# Patient Record
Sex: FEMALE | Race: WHITE | Employment: OTHER | URBAN - METROPOLITAN AREA
[De-identification: names, ages, dates, MRNs, and addresses within clinical notes are randomized per-mention and may not be internally consistent; named-entity substitution may affect disease eponyms.]

---

## 2017-02-02 ENCOUNTER — OFFICE VISIT (OUTPATIENT)
Dept: ENDOCRINOLOGY | Age: 52
End: 2017-02-02

## 2017-02-02 VITALS
HEART RATE: 70 BPM | SYSTOLIC BLOOD PRESSURE: 110 MMHG | RESPIRATION RATE: 18 BRPM | BODY MASS INDEX: 26.61 KG/M2 | TEMPERATURE: 96.3 F | WEIGHT: 155.9 LBS | DIASTOLIC BLOOD PRESSURE: 55 MMHG | HEIGHT: 64 IN

## 2017-02-02 DIAGNOSIS — E04.9 NODULAR GOITER: ICD-10-CM

## 2017-02-02 DIAGNOSIS — K31.84 GASTROPARESIS DUE TO DM (HCC): ICD-10-CM

## 2017-02-02 DIAGNOSIS — Z79.4 TYPE 2 DIABETES MELLITUS WITH HYPERGLYCEMIA, WITH LONG-TERM CURRENT USE OF INSULIN (HCC): Primary | ICD-10-CM

## 2017-02-02 DIAGNOSIS — I10 ESSENTIAL HYPERTENSION WITH GOAL BLOOD PRESSURE LESS THAN 140/90: ICD-10-CM

## 2017-02-02 DIAGNOSIS — E11.43 GASTROPARESIS DUE TO DM (HCC): ICD-10-CM

## 2017-02-02 DIAGNOSIS — D35.02 ADRENAL ADENOMA, LEFT: ICD-10-CM

## 2017-02-02 DIAGNOSIS — E11.65 TYPE 2 DIABETES MELLITUS WITH HYPERGLYCEMIA, WITH LONG-TERM CURRENT USE OF INSULIN (HCC): Primary | ICD-10-CM

## 2017-02-02 DIAGNOSIS — E53.8 VITAMIN B12 DEFICIENCY: ICD-10-CM

## 2017-02-02 LAB
GLUCOSE POC: 281 MG/DL
HBA1C MFR BLD HPLC: 8.8 %

## 2017-02-02 RX ORDER — INSULIN ASPART 100 [IU]/ML
INJECTION, SOLUTION INTRAVENOUS; SUBCUTANEOUS
Qty: 30 ML | Refills: 5 | Status: SHIPPED | OUTPATIENT
Start: 2017-02-02 | End: 2021-05-18 | Stop reason: SDUPTHER

## 2017-02-02 RX ORDER — CYANOCOBALAMIN 1000 UG/ML
1000 INJECTION, SOLUTION INTRAMUSCULAR; SUBCUTANEOUS
Qty: 1 VIAL | Refills: 5 | Status: SHIPPED | OUTPATIENT
Start: 2017-02-02

## 2017-02-02 RX ORDER — INSULIN GLARGINE 100 [IU]/ML
INJECTION, SOLUTION SUBCUTANEOUS
Qty: 15 ML | Refills: 5 | Status: SHIPPED | OUTPATIENT
Start: 2017-02-02 | End: 2017-12-18 | Stop reason: SDUPTHER

## 2017-02-02 NOTE — PROGRESS NOTES
Sherly Conteh is a 45 y.o. female here for   Chief Complaint   Patient presents with    Diabetes     f/u    Thyroid Problem     f/u    Adrenal Problem     f/u       Functional glucose monitor and record keeping system? - yes  Eye exam within last year? - yes April 2016  Foot exam within last year? - yes, 10/2016    Lab Results   Component Value Date/Time    Hemoglobin A1c (POC) 9.8 11/02/2016 04:50 PM    Hemoglobin A1c, External 10.3 08/12/2016       Wt Readings from Last 3 Encounters:   11/02/16 149 lb (67.6 kg)   06/21/16 151 lb 11.2 oz (68.8 kg)     Temp Readings from Last 3 Encounters:   11/02/16 96.8 °F (36 °C) (Oral)   06/21/16 98.3 °F (36.8 °C) (Oral)     BP Readings from Last 3 Encounters:   11/02/16 157/74   06/21/16 99/54     Pulse Readings from Last 3 Encounters:   11/02/16 69   06/21/16 75

## 2017-02-02 NOTE — MR AVS SNAPSHOT
Visit Information Date & Time Provider Department Dept. Phone Encounter #  
 2/2/2017  9:30 AM Nikole Mcneal MD [facility] [phone] [id number] Follow-up Instructions Return in about 3 months (around 5/2/2017). Upcoming Health Maintenance Date Due  
 FOOT EXAM Q1 8/14/1974 MICROALBUMIN Q1 8/14/1974 EYE EXAM RETINAL OR DILATED Q1 8/14/1974 Pneumococcal 19-64 Medium Risk (1 of 1 - PPSV23) 8/14/1983 DTaP/Tdap/Td series (1 - Tdap) 8/14/1985 PAP AKA CERVICAL CYTOLOGY 8/14/1985 BREAST CANCER SCRN MAMMOGRAM 8/14/2014 FOBT Q 1 YEAR AGE 50-75 8/14/2014 INFLUENZA AGE 9 TO ADULT 8/1/2016 HEMOGLOBIN A1C Q6M 5/2/2017 LIPID PANEL Q1 8/12/2017 Allergies as of 2/2/2017  Review Complete On: 2/2/2017 By: Nikole Mcneal MD  
  
 Severity Noted Reaction Type Reactions Codeine  06/21/2016    Other (comments) Welps Lyrica [Pregabalin]  06/21/2016    Palpitations Percocet [Oxycodone-acetaminophen]  06/21/2016    Other (comments) Welps Sulfur  06/21/2016    Contact Dermatitis Current Immunizations  Never Reviewed No immunizations on file. Not reviewed this visit You Were Diagnosed With   
  
 Codes Comments Type 2 diabetes mellitus with hyperglycemia, with long-term current use of insulin (HCC)    -  Primary ICD-10-CM: E11.65, Z79.4 ICD-9-CM: 250.00, 790.29, V58.67 Adrenal adenoma, left     ICD-10-CM: D35.02 
ICD-9-CM: 227.0 Nodular goiter     ICD-10-CM: E04.9 ICD-9-CM: 241.9 Essential hypertension with goal blood pressure less than 140/90     ICD-10-CM: I10 
ICD-9-CM: 401.9 Vitals BP Pulse Temp Resp Height(growth percentile) Weight(growth percentile) 110/55 (BP 1 Location: Right arm, BP Patient Position: Sitting) 70 96.3 °F (35.7 °C) (Oral) 18 5' 4\" (1.626 m) 155 lb 14.4 oz (70.7 kg) BMI OB Status Smoking Status 26.76 kg/m2 Hysterectomy Never Smoker BMI and BSA Data Body Mass Index Body Surface Area  
 26.76 kg/m 2 1.79 m 2 Preferred Pharmacy Pharmacy Name Phone [facility] [address], [address] [facility] Octavia Burrell [phone] Your Updated Medication List  
  
   
This list is accurate as of: 2/2/17  9:50 AM.  Always use your most recent med list. amLODIPine 10 mg tablet Commonly known as:  Sharath [place] Take 1 Tab by mouth every morning. aspirin delayed-release 81 mg tablet Take  by mouth daily. aspirin-dipyridamole  mg per SR capsule Commonly known as:  AGGRENOX Take 1 Cap by mouth two (2) times a day. atorvastatin 80 mg tablet Commonly known as:  LIPITOR Take 80 mg by mouth nightly. cyanocobalamin 1,000 mcg/mL injection Commonly known as:  VITAMIN B12  
1,000 mcg by IntraMUSCular route every thirty (30) days. DULoxetine 60 mg capsule Commonly known as:  CYMBALTA Take 60 mg by mouth daily. ergocalciferol 50,000 unit capsule Commonly known as:  ERGOCALCIFEROL Take 2,000 Units by mouth daily. folic acid 1 mg tablet Commonly known as:  FOLVITE  
take 1 tablet by mouth once daily  
  
 insulin aspart 100 unit/mL Inpn Commonly known as:  NOVOLOG  
14 Units by SubCUTAneous route Before breakfast, lunch, and dinner. W/ SSI  
  
 meloxicam 7.5 mg tablet Commonly known as:  MOBIC  
take 1 tablet by mouth once daily  
  
 metoprolol succinate 100 mg tablet Commonly known as:  TOPROL-XL Take 100 mg by mouth daily. ondansetron hcl 4 mg tablet Commonly known as:  Kiran Symone Take 4 mg by mouth every eight (8) hours as needed for Nausea. pantoprazole 20 mg tablet Commonly known as:  PROTONIX Take 20 mg by mouth daily. SITagliptin 100 mg tablet Commonly known as:  [place] Take 100 mg by mouth every morning. valsartan 160 mg tablet Commonly known as:  DIOVAN Take 160 mg by mouth daily. zolpidem 5 mg tablet Commonly known as:  AMBIEN Take  by mouth nightly as needed for Sleep. We Performed the Following AMB POC GLUCOSE, QUANTITATIVE, BLOOD [64630 CPT(R)] AMB POC HEMOGLOBIN A1C [34826 CPT(R)] Follow-up Instructions Return in about 3 months (around 5/2/2017). Patient Instructions Check blood sugars before meals/breakfast and at bedtime. Low blood glucose is less than 70 Maintain the log and bring it all your appointments If the bedtime sugars are less than 100 ,eat a 15 gm snack. Lantus 45 units in AM  
 
 
Novolog or Humalog or Apidra insulin 14 units after breakfast, 14 units after lunch and 18 units after dinner. If sugars before meals are less than 70 then take half the scheduled dose instead of the full dose Additional Novolog or Humalog or Apidra  for high blood sugars 150-200 mg   2 units 201-250 mg   4 units 251-300 mg   6 units 301-350 mg   8 units 351-400 mg   10 units Introducing [facility] SERVICES! Dear Kitty Correa: Thank you for requesting a [employer] account. Our records indicate that you already have an active [employer] account. You can access your account anytime at [email or web address] Did you know that you can access your hospital and ER discharge instructions at any time in [employer]? You can also review all of your test results from your hospital stay or ER visit. Additional Information If you have questions, please visit the Frequently Asked Questions section of the [employer] website at [email or web address] Remember, [employer] is NOT to be used for urgent needs. For medical emergencies, dial 911. Now available from your iPhone and Android! Please provide this summary of care documentation to your next provider. Your primary care clinician is listed as Nahomy Antonio. If you have any questions after today's visit, please call [phone].

## 2017-02-02 NOTE — PATIENT INSTRUCTIONS
Check blood sugars before meals/breakfast and at bedtime. Low blood glucose is less than 70     Maintain the log and bring it all your appointments    If the bedtime sugars are less than 100 ,eat a 15 gm snack. Lantus 45 units in AM       Novolog or Humalog or Apidra insulin 14 units after breakfast, 14 units after lunch and 18 units after dinner.    If sugars before meals are less than 70 then take half the scheduled dose instead of the full dose    Additional Novolog or Humalog or Apidra  for high blood sugars     150-200 mg   2 units   201-250 mg   4 units   251-300 mg   6 units   301-350 mg   8 units   351-400 mg   10 units

## 2017-02-02 NOTE — PROGRESS NOTES
[facility]               Barb Smith MD        [address] 78 [phone] Fax [phone]               Patient Information  Date:2/2/2017  Name : Nunu Craig 45 y.o.     YOB: 1965         Referred by: MIKI Murray         Chief Complaint   Patient presents with    Diabetes     f/u    Thyroid Problem     f/u    Adrenal Problem     f/u       History of Present Illness: Nunu Craig is a 45 y.o. female here for  Fu of  Type 2 Diabetes Mellitus. Type 2 Diabetes was diagnosed in 2006 . End organ effects of diabetes: peripheral neuropathy, CVA     Cardiovascular risk factors: smoking/ tobacco exposure, family history, dyslipidemia, diabetes mellitus   Monitoring frequency:3 /day and readings run 130 - 180     She was found to have left 1 cm adrenal nodule on CT scan - incidental finding   Work up was negative    No log as she forgot   fastings and bedtime BG are higher     In pain and being followed by spine clinic       Has gastroparesis   No relation to food        Wt Readings from Last 3 Encounters:   02/02/17 155 lb 14.4 oz (70.7 kg)   11/02/16 149 lb (67.6 kg)   06/21/16 151 lb 11.2 oz (68.8 kg)       BP Readings from Last 3 Encounters:   02/02/17 110/55   11/02/16 157/74   06/21/16 99/54           Past Medical History   Diagnosis Date    CAD (coronary artery disease)     [facility]     Fibromyalgia     Hyperlipidemia     Hypertension     Lupus [facility]     Neuropathy     Stroke ([facility] 12/2015     Current Outpatient Prescriptions   Medication Sig    meloxicam (MOBIC) 7.5 mg tablet take 1 tablet by mouth once daily     folic acid (FOLVITE) 1 mg tablet take 1 tablet by mouth once daily    amLODIPine (NORVASC) 10 mg tablet Take 1 Tab by mouth every morning.  sitaGLIPtin (JANUVIA) 100 mg tablet Take 100 mg by mouth every morning.  valsartan (DIOVAN) 160 mg tablet Take 160 mg by mouth daily.     metoprolol succinate (TOPROL-XL) 100 mg tablet Take 100 mg by mouth daily.  DULoxetine (CYMBALTA) 60 mg capsule Take 60 mg by mouth daily.  pantoprazole (PROTONIX) 20 mg tablet Take 20 mg by mouth daily.  aspirin delayed-release 81 mg tablet Take  by mouth daily.  ergocalciferol (ERGOCALCIFEROL) 50,000 unit capsule Take 2,000 Units by mouth daily.  atorvastatin (LIPITOR) 80 mg tablet Take 80 mg by mouth nightly.  aspirin-dipyridamole (AGGRENOX)  mg per SR capsule Take 1 Cap by mouth two (2) times a day.  zolpidem (AMBIEN) 5 mg tablet Take  by mouth nightly as needed for Sleep.  ondansetron hcl (ZOFRAN) 4 mg tablet Take 4 mg by mouth every eight (8) hours as needed for Nausea.  cyanocobalamin (VITAMIN B12) 1,000 mcg/mL injection 1 mL by IntraMUSCular route every thirty (30) days.  insulin aspart (NOVOLOG) 100 unit/mL inpn Inject 14 units after breakfast, 14 units after lunch and 18 units after dinner w/ SSI Max units daily: 76    insulin glargine (LANTUS SOLOSTAR) 100 unit/mL (3 mL) pen Inject 45 units in the AM     No current facility-administered medications for this visit. Allergies   Allergen Reactions    Codeine Other (comments)     Welps    Lyrica [Pregabalin] Palpitations    Percocet [Oxycodone-Acetaminophen] Other (comments)     Welps    Sulfur Contact Dermatitis         Review of Systems:  - Constitutional Symptoms: no fevers, no chills, no weight loss  - Eyes: no blurry vision no double vision  - Cardiovascular: no chest pain ,no palpitations  - Musculoskeletal: no joint pains + weakness  - Integumentary: no rashes  - Neurological: + numbness, tingling, no  headaches  - Psychiatric: no depression no  anxiety  - Endocrine: no heat or cold intolerance    Physical Examination:   Blood pressure 110/55, pulse 70, temperature 96.3 °F (35.7 °C), temperature source Oral, resp. rate 18, height 5' 4\" (1.626 m), weight 155 lb 14.4 oz (70.7 kg).  Estimated body mass index is 26.76 kg/(m^2) as calculated from the following:    Height as of this encounter: 5' 4\" (1.626 m). -   Weight as of this encounter: 155 lb 14.4 oz (70.7 kg). - General: pleasant, no distress, good eye contact  - HEENT: no pallor, no periorbital edema, EOMI  - Neck: supple,   - Cardiovascular: regular, normal rate, normal S1 and S2,   - Respiratory: clear to auscultation bilaterally  - Gastrointestinal: soft, nontender, nondistended,  BS +  - Musculoskeletal: no proximal muscle weakness in upper or lower extremities  - Integumentary:  callus   - Neurological: alert and oriented  - Psychiatric: normal mood and affect  - Skin: color, texture, turgor normal.       Data Reviewed:     [] Glucose records reviewed. [] See glucose records for details (to be scanned). [] A1C  [] Reviewed labs    CR nl     Assessment/Plan:     1. Type 2 diabetes mellitus with hyperglycemia, with long-term current use of insulin ([address]    2. Adrenal adenoma, left    3. Nodular goiter    4. Essential hypertension with goal blood pressure less than 140/90    5. Gastroparesis due to DM ([address]        1. Type 2 Diabetes Mellitus with neuropathy ,CVD ,uncontrolled    Lab Results   Component Value Date/Time    Hemoglobin A1c (POC) 8.8 02/02/2017 09:31 AM    Hemoglobin A1c, External 10.3 08/12/2016       Lantus 45 units   Humalog 14/14/18  units after  meals   Mismatch of insulin and food absorption due to gastroparesis , small meals     Advised to check glucose 3- 4 times daily        2. HTN : Continue current therapy     3. Hyperlipidemia : Continue statin. 4.Gatroparesis  Gastroparesis -Dietary modifications discussed - small frequent meals , on narcotics which is worsening     5 Hypoglycemia - IPRO not done      6 Nodular goiter - euthyroid   US 1 cm nodule  Discussed natural course of thyroid nodules     7.  CVA -   > 50 % of the time spent face to time     8 Left adrenal nodule - normal work in 2016     Patient Instructions   Check blood sugars before meals/breakfast and at bedtime. Low blood glucose is less than 70     Maintain the log and bring it all your appointments    If the bedtime sugars are less than 100 ,eat a 15 gm snack. Lantus 45 units in AM       Novolog or Humalog or Apidra insulin 14 units after breakfast, 14 units after lunch and 18 units after dinner. If sugars before meals are less than 70 then take half the scheduled dose instead of the full dose    Additional Novolog or Humalog or Apidra  for high blood sugars     150-200 mg   2 units   201-250 mg   4 units   251-300 mg   6 units   301-350 mg   8 units   351-400 mg   10 units           Follow-up Disposition:  Return in about 3 months (around 5/2/2017). Thank you for allowing me to participate in the care of this patient.     Karely Hawkins MD      Patient verbalized understanding

## 2017-05-08 DIAGNOSIS — I10 ESSENTIAL HYPERTENSION: ICD-10-CM

## 2017-05-08 DIAGNOSIS — E11.65 UNCONTROLLED TYPE 2 DIABETES MELLITUS WITH HYPERGLYCEMIA, WITH LONG-TERM CURRENT USE OF INSULIN (HCC): ICD-10-CM

## 2017-05-08 DIAGNOSIS — Z79.4 UNCONTROLLED TYPE 2 DIABETES MELLITUS WITH HYPERGLYCEMIA, WITH LONG-TERM CURRENT USE OF INSULIN (HCC): ICD-10-CM

## 2017-05-08 RX ORDER — AMLODIPINE BESYLATE 10 MG/1
10 TABLET ORAL
Qty: 30 TAB | Refills: 5 | Status: SHIPPED | OUTPATIENT
Start: 2017-05-08 | End: 2017-11-20 | Stop reason: SDUPTHER

## 2017-11-20 DIAGNOSIS — I10 ESSENTIAL HYPERTENSION: ICD-10-CM

## 2017-11-20 DIAGNOSIS — Z79.4 UNCONTROLLED TYPE 2 DIABETES MELLITUS WITH HYPERGLYCEMIA, WITH LONG-TERM CURRENT USE OF INSULIN (HCC): ICD-10-CM

## 2017-11-20 DIAGNOSIS — E11.65 UNCONTROLLED TYPE 2 DIABETES MELLITUS WITH HYPERGLYCEMIA, WITH LONG-TERM CURRENT USE OF INSULIN (HCC): ICD-10-CM

## 2017-11-20 RX ORDER — AMLODIPINE BESYLATE 10 MG/1
10 TABLET ORAL
Qty: 30 TAB | Refills: 5 | Status: SHIPPED | OUTPATIENT
Start: 2017-11-20

## 2017-12-18 DIAGNOSIS — Z79.4 TYPE 2 DIABETES MELLITUS WITH HYPERGLYCEMIA, WITH LONG-TERM CURRENT USE OF INSULIN (HCC): ICD-10-CM

## 2017-12-18 DIAGNOSIS — E11.65 TYPE 2 DIABETES MELLITUS WITH HYPERGLYCEMIA, WITH LONG-TERM CURRENT USE OF INSULIN (HCC): ICD-10-CM

## 2017-12-18 RX ORDER — INSULIN GLARGINE 100 [IU]/ML
INJECTION, SOLUTION SUBCUTANEOUS
Qty: 15 ML | Refills: 5 | Status: SHIPPED | OUTPATIENT
Start: 2017-12-18 | End: 2018-02-07 | Stop reason: SDUPTHER

## 2018-01-29 ENCOUNTER — IP HISTORICAL/CONVERTED ENCOUNTER (OUTPATIENT)
Dept: OTHER | Age: 53
End: 2018-01-29

## 2018-02-07 DIAGNOSIS — E11.65 TYPE 2 DIABETES MELLITUS WITH HYPERGLYCEMIA, WITH LONG-TERM CURRENT USE OF INSULIN (HCC): ICD-10-CM

## 2018-02-07 DIAGNOSIS — Z79.4 TYPE 2 DIABETES MELLITUS WITH HYPERGLYCEMIA, WITH LONG-TERM CURRENT USE OF INSULIN (HCC): ICD-10-CM

## 2018-02-07 RX ORDER — INSULIN GLARGINE 100 [IU]/ML
INJECTION, SOLUTION SUBCUTANEOUS
Qty: 15 ML | Refills: 5 | Status: SHIPPED | OUTPATIENT
Start: 2018-02-07 | End: 2018-03-07 | Stop reason: SDUPTHER

## 2018-02-13 ENCOUNTER — OP HISTORICAL/CONVERTED ENCOUNTER (OUTPATIENT)
Dept: OTHER | Age: 53
End: 2018-02-13

## 2018-03-07 DIAGNOSIS — E11.65 TYPE 2 DIABETES MELLITUS WITH HYPERGLYCEMIA, WITH LONG-TERM CURRENT USE OF INSULIN (HCC): Primary | ICD-10-CM

## 2018-03-07 DIAGNOSIS — Z79.4 TYPE 2 DIABETES MELLITUS WITH HYPERGLYCEMIA, WITH LONG-TERM CURRENT USE OF INSULIN (HCC): Primary | ICD-10-CM

## 2018-03-07 RX ORDER — INSULIN GLARGINE 100 [IU]/ML
INJECTION, SOLUTION SUBCUTANEOUS
Qty: 15 ML | Refills: 1 | Status: SHIPPED | OUTPATIENT
Start: 2018-03-07 | End: 2018-07-23 | Stop reason: SDUPTHER

## 2018-04-25 ENCOUNTER — OP HISTORICAL/CONVERTED ENCOUNTER (OUTPATIENT)
Dept: OTHER | Age: 53
End: 2018-04-25

## 2018-07-23 DIAGNOSIS — Z79.4 TYPE 2 DIABETES MELLITUS WITH HYPERGLYCEMIA, WITH LONG-TERM CURRENT USE OF INSULIN (HCC): ICD-10-CM

## 2018-07-23 DIAGNOSIS — E11.65 TYPE 2 DIABETES MELLITUS WITH HYPERGLYCEMIA, WITH LONG-TERM CURRENT USE OF INSULIN (HCC): ICD-10-CM

## 2018-07-23 RX ORDER — INSULIN GLARGINE 100 [IU]/ML
INJECTION, SOLUTION SUBCUTANEOUS
Qty: 45 ML | Refills: 6 | Status: SHIPPED | OUTPATIENT
Start: 2018-07-23

## 2018-10-17 ENCOUNTER — OP HISTORICAL/CONVERTED ENCOUNTER (OUTPATIENT)
Dept: OTHER | Age: 53
End: 2018-10-17

## 2019-07-23 ENCOUNTER — OP HISTORICAL/CONVERTED ENCOUNTER (OUTPATIENT)
Dept: OTHER | Age: 54
End: 2019-07-23

## 2019-11-25 ENCOUNTER — OP HISTORICAL/CONVERTED ENCOUNTER (OUTPATIENT)
Dept: OTHER | Age: 54
End: 2019-11-25

## 2020-01-28 ENCOUNTER — OP HISTORICAL/CONVERTED ENCOUNTER (OUTPATIENT)
Dept: OTHER | Age: 55
End: 2020-01-28

## 2020-02-06 ENCOUNTER — OP HISTORICAL/CONVERTED ENCOUNTER (OUTPATIENT)
Dept: OTHER | Age: 55
End: 2020-02-06

## 2020-02-07 ENCOUNTER — OP HISTORICAL/CONVERTED ENCOUNTER (OUTPATIENT)
Dept: OTHER | Age: 55
End: 2020-02-07

## 2020-12-08 ENCOUNTER — TRANSCRIBE ORDER (OUTPATIENT)
Dept: SCHEDULING | Age: 55
End: 2020-12-08

## 2020-12-08 DIAGNOSIS — Z12.31 ENCOUNTER FOR SCREENING MAMMOGRAM FOR MALIGNANT NEOPLASM OF BREAST: Primary | ICD-10-CM

## 2020-12-08 DIAGNOSIS — F33.1 MODERATE RECURRENT MAJOR DEPRESSION (HCC): ICD-10-CM

## 2020-12-08 DIAGNOSIS — F41.9 ANXIETY DISORDER: ICD-10-CM

## 2021-02-22 ENCOUNTER — TRANSCRIBE ORDER (OUTPATIENT)
Dept: REGISTRATION | Age: 56
End: 2021-02-22

## 2021-02-22 DIAGNOSIS — R91.1 PULMONARY NODULE: Primary | ICD-10-CM

## 2021-02-25 ENCOUNTER — TRANSCRIBE ORDER (OUTPATIENT)
Dept: REGISTRATION | Age: 56
End: 2021-02-25

## 2021-02-25 ENCOUNTER — HOSPITAL ENCOUNTER (OUTPATIENT)
Dept: MAMMOGRAPHY | Age: 56
Discharge: HOME | End: 2021-02-25
Attending: FAMILY MEDICINE
Payer: MEDICARE

## 2021-02-25 ENCOUNTER — HOSPITAL ENCOUNTER (OUTPATIENT)
Dept: LAB | Age: 56
Discharge: HOME | End: 2021-02-25
Attending: FAMILY MEDICINE
Payer: MEDICARE

## 2021-02-25 ENCOUNTER — HOSPITAL ENCOUNTER (OUTPATIENT)
Dept: CT IMAGING | Age: 56
Discharge: HOME | End: 2021-02-25
Attending: FAMILY MEDICINE
Payer: MEDICARE

## 2021-02-25 DIAGNOSIS — R91.1 COIN LESION: ICD-10-CM

## 2021-02-25 DIAGNOSIS — R91.1 COIN LESION: Primary | ICD-10-CM

## 2021-02-25 DIAGNOSIS — F41.9 ANXIETY DISORDER: ICD-10-CM

## 2021-02-25 DIAGNOSIS — F33.1 MODERATE RECURRENT MAJOR DEPRESSION (HCC): ICD-10-CM

## 2021-02-25 DIAGNOSIS — Z12.31 ENCOUNTER FOR SCREENING MAMMOGRAM FOR MALIGNANT NEOPLASM OF BREAST: ICD-10-CM

## 2021-02-25 DIAGNOSIS — R91.1 PULMONARY NODULE: ICD-10-CM

## 2021-02-25 LAB — CREAT SERPL-MCNC: 0.83 MG/DL (ref 0.55–1.02)

## 2021-02-25 PROCEDURE — 36415 COLL VENOUS BLD VENIPUNCTURE: CPT

## 2021-02-25 PROCEDURE — 82565 ASSAY OF CREATININE: CPT

## 2021-02-25 PROCEDURE — 74011000636 HC RX REV CODE- 636: Performed by: PHYSICIAN ASSISTANT

## 2021-02-25 PROCEDURE — 77063 BREAST TOMOSYNTHESIS BI: CPT

## 2021-02-25 PROCEDURE — 71260 CT THORAX DX C+: CPT

## 2021-02-25 RX ADMIN — IOPAMIDOL 100 ML: 755 INJECTION, SOLUTION INTRAVENOUS at 10:28

## 2021-04-05 ENCOUNTER — TRANSCRIBE ORDER (OUTPATIENT)
Dept: SCHEDULING | Age: 56
End: 2021-04-05

## 2021-04-05 DIAGNOSIS — R06.02 SHORTNESS OF BREATH: ICD-10-CM

## 2021-04-05 DIAGNOSIS — R91.8 LUNG NODULES: Primary | ICD-10-CM

## 2021-05-18 ENCOUNTER — OFFICE VISIT (OUTPATIENT)
Dept: ENDOCRINOLOGY | Age: 56
End: 2021-05-18
Payer: MEDICARE

## 2021-05-18 VITALS
DIASTOLIC BLOOD PRESSURE: 62 MMHG | BODY MASS INDEX: 25.92 KG/M2 | OXYGEN SATURATION: 100 % | HEIGHT: 64 IN | HEART RATE: 78 BPM | WEIGHT: 151.8 LBS | SYSTOLIC BLOOD PRESSURE: 147 MMHG | RESPIRATION RATE: 16 BRPM

## 2021-05-18 DIAGNOSIS — Z79.4 UNCONTROLLED TYPE 2 DIABETES MELLITUS WITH HYPERGLYCEMIA, WITH LONG-TERM CURRENT USE OF INSULIN (HCC): ICD-10-CM

## 2021-05-18 DIAGNOSIS — E11.65 TYPE 2 DIABETES MELLITUS WITH HYPERGLYCEMIA, WITH LONG-TERM CURRENT USE OF INSULIN (HCC): ICD-10-CM

## 2021-05-18 DIAGNOSIS — E11.65 UNCONTROLLED TYPE 2 DIABETES MELLITUS WITH HYPERGLYCEMIA, WITH LONG-TERM CURRENT USE OF INSULIN (HCC): ICD-10-CM

## 2021-05-18 DIAGNOSIS — R73.09 ELEVATED HEMOGLOBIN A1C: Primary | ICD-10-CM

## 2021-05-18 DIAGNOSIS — Z79.4 TYPE 2 DIABETES MELLITUS WITH HYPERGLYCEMIA, WITH LONG-TERM CURRENT USE OF INSULIN (HCC): ICD-10-CM

## 2021-05-18 DIAGNOSIS — E04.2 MULTINODULAR GOITER: ICD-10-CM

## 2021-05-18 DIAGNOSIS — I10 ESSENTIAL HYPERTENSION: ICD-10-CM

## 2021-05-18 LAB — HBA1C MFR BLD HPLC: 13.3 %

## 2021-05-18 PROCEDURE — G9899 SCRN MAM PERF RSLTS DOC: HCPCS | Performed by: INTERNAL MEDICINE

## 2021-05-18 PROCEDURE — 3046F HEMOGLOBIN A1C LEVEL >9.0%: CPT | Performed by: INTERNAL MEDICINE

## 2021-05-18 PROCEDURE — 3017F COLORECTAL CA SCREEN DOC REV: CPT | Performed by: INTERNAL MEDICINE

## 2021-05-18 PROCEDURE — G8754 DIAS BP LESS 90: HCPCS | Performed by: INTERNAL MEDICINE

## 2021-05-18 PROCEDURE — 99205 OFFICE O/P NEW HI 60 MIN: CPT | Performed by: INTERNAL MEDICINE

## 2021-05-18 PROCEDURE — G8432 DEP SCR NOT DOC, RNG: HCPCS | Performed by: INTERNAL MEDICINE

## 2021-05-18 PROCEDURE — G8753 SYS BP > OR = 140: HCPCS | Performed by: INTERNAL MEDICINE

## 2021-05-18 PROCEDURE — G8427 DOCREV CUR MEDS BY ELIG CLIN: HCPCS | Performed by: INTERNAL MEDICINE

## 2021-05-18 PROCEDURE — G8419 CALC BMI OUT NRM PARAM NOF/U: HCPCS | Performed by: INTERNAL MEDICINE

## 2021-05-18 PROCEDURE — 2022F DILAT RTA XM EVC RTNOPTHY: CPT | Performed by: INTERNAL MEDICINE

## 2021-05-18 PROCEDURE — 83036 HEMOGLOBIN GLYCOSYLATED A1C: CPT | Performed by: INTERNAL MEDICINE

## 2021-05-18 RX ORDER — PEN NEEDLE, DIABETIC 31 GX3/16"
NEEDLE, DISPOSABLE MISCELLANEOUS
Qty: 200 PEN NEEDLE | Refills: 6 | Status: SHIPPED | OUTPATIENT
Start: 2021-05-18

## 2021-05-18 RX ORDER — INSULIN GLARGINE 100 [IU]/ML
80 INJECTION, SOLUTION SUBCUTANEOUS
COMMUNITY
End: 2021-05-18 | Stop reason: SDUPTHER

## 2021-05-18 RX ORDER — INSULIN ASPART 100 [IU]/ML
INJECTION, SOLUTION INTRAVENOUS; SUBCUTANEOUS
Qty: 30 ML | Refills: 6 | Status: SHIPPED | OUTPATIENT
Start: 2021-05-18

## 2021-05-18 RX ORDER — ISOSORBIDE MONONITRATE 60 MG/1
TABLET, EXTENDED RELEASE ORAL
COMMUNITY
Start: 2021-05-16

## 2021-05-18 RX ORDER — QUETIAPINE FUMARATE 25 MG/1
TABLET, FILM COATED ORAL
COMMUNITY
Start: 2021-05-13

## 2021-05-18 RX ORDER — INSULIN GLARGINE 100 [IU]/ML
60 INJECTION, SOLUTION SUBCUTANEOUS
Qty: 30 ML | Refills: 6 | Status: SHIPPED | OUTPATIENT
Start: 2021-05-18

## 2021-05-18 RX ORDER — CLOPIDOGREL BISULFATE 75 MG/1
75 TABLET ORAL DAILY
COMMUNITY

## 2021-05-18 RX ORDER — LOSARTAN POTASSIUM 100 MG/1
TABLET ORAL
COMMUNITY
Start: 2021-04-21

## 2021-05-18 RX ORDER — SERTRALINE HYDROCHLORIDE 50 MG/1
TABLET, FILM COATED ORAL
COMMUNITY
Start: 2021-05-13

## 2021-05-18 NOTE — PATIENT INSTRUCTIONS
SPECIFIC INSTRUCTIONS BELOW [address] [facility] water Do not skip meals Do not eat in between meals Reduce carbs- pasta, rice, potatoes, bread Try to avoid processed bread products like BISCUITS, CROISSANTS, MUFFINS Do not drink juices or sodas, even if they are calorie zero or diet drinks and especially avoid using powders like crystalloids , ALEM-AIDS Do not eat peanut butter Do not eat sugar free cookies and cakes Do not eat peaches, oranges, pineapples, raisins, grapes , canteloupe , honey dew, mangoes , watermelon  and fruit medleys 
 
 
------------------------------------------------------------------------------------------------- Check blood sugars immediately before each meal and at bedtime STAY ON Sage Redder Take  lantus  insulin  60    units  at bed time Take NOVOLOG  insulin 8   units before MEALS Also, add additional NOVOLOG as follows with meals  If blood sugars are[de-identified] 
 
150-200 mg 2 units 201-250 mg 4 units 251-300 mg 6 units 301-350 mg 8 units 351-400 mg 10 units 401-450 mg 12 units 451-500 mg 14 units Less than 70 mg NO INSULIN 
 
 
------------------------------------------------------------------------------------------------- 
 
 
PAY ATTENTION TO THESE GENERAL INSTRUCTIONS  
 
-ANY tests other than blood work, which you opt to do  outside [facility] imaging facilities, you are responsible for prior authorizations if  required  
- [address] [facility] AVS- PLEASE IGNORE  
- YOUR MED LIST IS NOT UP TO DATE AS SOME CHANGES ARE BEING MADE AFTER THE VISIT - [address] Results *Normal results will not be notified by a phone call starting January 1 2021 *If you have an upcoming visit, the results will be discussed at the visit *Please sign up for MY CHART if you want access to your lab and test results *Abnormal results which require immediate attention will be notified by phone call *Abnormal results which do not require immediate assistance will be notified in 1-2 weeks Refills    -    have your pharmacy send us a refill request 
Phone calls  -  Allow  24 hrs. for non-urgent calls to be returned Prior authorization - It may take 2-4 weeks to process Forms  -  FMLA, DMV etc., will take up to 2 weeks to process Cancellations - please notify the office 2 days in advance Samples  - will only be dispensed at visits  
 
--------------------------------------------------------------------------------------------

## 2021-05-18 NOTE — LETTER
5/23/2021    Patient: Delbert Dos Santos   YOB: 1965   Date of Visit: 5/18/2021     Yancy Whittaker, [address] [address] [address]  Via Fax: [phone]     Deisi Hicks MD  [address] [address]  Via Fax: [phone]    Dear MD Deisi Small MD,      Thank you for referring Ms. Rolando Wyatt to [address] [place] [address] for evaluation. My notes for this consultation are attached. If you have questions, please do not hesitate to call me. I look forward to following your patient along with you.       Sincerely,    Sanford Gallego MD

## 2021-05-18 NOTE — PROGRESS NOTES
Room: 6    Identified pt with two pt identifiers(name and ). Reviewed record in preparation for visit and have obtained necessary documentation. All patient medications has been reviewed. Chief Complaint   Patient presents with    Diabetes    New Patient       Health Maintenance Due   Topic    Pneumococcal 0-64 years (1 of 1 - PPSV23)    Foot Exam Q1     MICROALBUMIN Q1     Eye Exam Retinal or Dilated     COVID-19 Vaccine (1)    DTaP/Tdap/Td series (1 - Tdap)    PAP AKA CERVICAL CYTOLOGY     Shingrix Vaccine Age 49> (1 of 2)    Colorectal Cancer Screening Combo     Lipid Screen     A1C test (Diabetic or Prediabetic)     Medicare Yearly Exam      Eye exam:     Foot exam: 6 months ago       Vitals:    21 0915   BP: (!) 147/62   Pulse: 78   Resp: 16   SpO2: 100%   Weight: 151 lb 12.8 oz (68.9 kg)   Height: 5' 4\" (1.626 m)   PainSc:   0 - No pain       4. Have you been to the ER, urgent care clinic since your last visit? Hospitalized since your last visit? No    5. Have you seen or consulted any other health care providers outside of the [address] [place] [address] since your last visit? Include any pap smears or colon screening. No        Patient is accompanied by self I have received verbal consent from Mateus Duffy to discuss any/all medical information while they are present in the room.

## 2021-05-18 NOTE — PROGRESS NOTES
[facility]  Korey Stout MD, Roxana Prieto is a 63 y.o. female presents today as a new DM Patient. HPI    Patient here for initial visit of Type 2 diabetes mellitus . Referred : by self/pcp    H/o diabetes for many  years   She will be MOVING TO  [facility]     Current A1C is over 13  %  and symptoms/problems include polyuria, polydipsia and visual disturbances     Current diabetic medications include intensive insulin injection program. Fabian Marina     Current monitoring regimen: home blood tests - daily  Home blood sugar records: fluctuant   Any episodes of hypoglycemia? no    Weight trend: fluctuating a lot  Prior visit with dietician: no  Current diet: \"unhealthy\" diet in general  Current exercise: no regular exercise    Known diabetic complications: none and retinopathy  Cardiovascular risk factors: dyslipidemia, diabetes mellitus    Eye exam current (within one year): no  CARI: unknown     Past Medical History:   Diagnosis Date    CAD (coronary artery disease)     Diabetes ([facility]     Fibromyalgia     Hyperlipidemia     Hypertension     Lupus ([facility]     Neuropathy     Stroke ([facility] 12/2015     Past Surgical History:   Procedure Laterality Date    HX CHOLECYSTECTOMY      HX PARTIAL HYSTERECTOMY      HX TONSILLECTOMY      HX TUBAL LIGATION      HX VASCULAR STENT Bilateral     2020      Current Outpatient Medications   Medication Sig    clopidogreL (PLAVIX) 75 mg tab Take 75 mg by mouth daily.  insulin glargine (Lantus Solostar U-100 Insulin) 100 unit/mL (3 mL) inpn 80 Units by SubCUTAneous route nightly.     QUEtiapine (SEROquel) 25 mg tablet take 1 to 2 tablets by mouth at bedtime for insomnia    sertraline (ZOLOFT) 50 mg tablet take 1 tablet by mouth daily    isosorbide mononitrate ER (IMDUR) 60 mg CR tablet take 1 tablet by mouth twice a day    losartan (COZAAR) 100 mg tablet take 1 tablet by mouth once daily    amLODIPine (NORVASC) 10 mg tablet Take 1 Tab by mouth every morning.  insulin aspart (NOVOLOG) 100 unit/mL inpn Inject 14 units after breakfast, 14 units after lunch and 18 units after dinner w/ SSI Max units daily: 76    sitaGLIPtin (JANUVIA) 100 mg tablet Take 100 mg by mouth every morning.  metoprolol succinate (TOPROL-XL) 100 mg tablet Take 100 mg by mouth daily.  DULoxetine (CYMBALTA) 60 mg capsule Take 60 mg by mouth daily.  pantoprazole (PROTONIX) 20 mg tablet Take 20 mg by mouth daily.  aspirin delayed-release 81 mg tablet Take  by mouth daily.  atorvastatin (LIPITOR) 80 mg tablet Take 80 mg by mouth nightly.  insulin glargine (BASAGLAR KWIKPEN U-100 INSULIN) 100 unit/mL (3 mL) inpn 45 units in the AM LANTUS NOT COVERED (Patient not taking: Reported on 5/18/2021)    cyanocobalamin (VITAMIN B12) 1,000 mcg/mL injection 1 mL by IntraMUSCular route every thirty (30) days. (Patient not taking: Reported on 5/18/2021)    meloxicam (MOBIC) 7.5 mg tablet take 1 tablet by mouth once daily     folic acid (FOLVITE) 1 mg tablet take 1 tablet by mouth once daily    valsartan (DIOVAN) 160 mg tablet Take 160 mg by mouth daily.  ergocalciferol (ERGOCALCIFEROL) 50,000 unit capsule Take 2,000 Units by mouth daily.  aspirin-dipyridamole (AGGRENOX)  mg per SR capsule Take 1 Cap by mouth two (2) times a day.  zolpidem (AMBIEN) 5 mg tablet Take  by mouth nightly as needed for Sleep.  ondansetron hcl (ZOFRAN) 4 mg tablet Take 4 mg by mouth every eight (8) hours as needed for Nausea. No current facility-administered medications for this visit. Review of Systems   Constitutional: Negative. HENT: Negative. Eyes: Negative. Respiratory: Negative. Cardiovascular: Negative. Gastrointestinal: Negative. Genitourinary: Negative. Musculoskeletal: Negative. Skin: Negative. Neurological: Negative. Endo/Heme/Allergies: Negative. Psychiatric/Behavioral: Negative.            Vitals:    05/18/21 0915   BP: (!) 147/62   BP 1 Location: Left upper arm   BP Patient Position: Sitting   BP Cuff Size: Adult   Pulse: 78   Resp: 16   SpO2: 100%   Weight: 151 lb 12.8 oz (68.9 kg)   Height: 5' 4\" (1.626 m)        Physical Exam  Constitutional:       Appearance: She is well-developed. HENT:      Head: Normocephalic and atraumatic. Eyes:      Conjunctiva/sclera: Conjunctivae normal.      Pupils: Pupils are equal, round, and reactive to light. Cardiovascular:      Rate and Rhythm: Normal rate and regular rhythm. Pulmonary:      Effort: Pulmonary effort is normal.      Breath sounds: Normal breath sounds. Abdominal:      General: Bowel sounds are normal.      Palpations: Abdomen is soft. Musculoskeletal:         General: Normal range of motion. Cervical back: Normal range of motion and neck supple. Skin:     General: Skin is warm and dry. Comments: Has acanthosis on neck and under arms   Neurological:      Mental Status: She is alert and oriented to person, place, and time. Deep Tendon Reflexes: Reflexes are normal and symmetric. [x] Recent labs have been reviewed from referring provider. [] Recent labs were not available at time of visit. Assessment and Plan     1. Type 2 DM poorly  controlled :  a1c is 13.3 %   From May 18 2021     Reviewed the glucose log : no  Discussed patho-physiology of diabetes     STAY ON JANUVIA   Take  lantus  insulin  60    units  at bed time  Take NOVOLOG  insulin 8   units before MEALS   Patient is advised to check blood sugars 1-2  times daily by rotation method.   reviewed medications and side effects in detail  lab results and schedule of future lab studies reviewed with patient    specific diabetic recommendations: diabetic diet discussed in detail, written exchange diet given, low cholesterol diet, weight control and daily exercise discussed, home glucose monitoring demonstrated and taught, all medications, side effects and compliance discussed carefully, use and side effects of insulin is taught, foot care discussed and Podiatry visits discussed and annual eye examinations at Ophthalmology discussed    2. Hypoglycemia :  Educated on treating the hypoglycemia. 3. HTN : continue current care. Patient is educated about importance of compliance with anti-hypertensives especially ARB/ACEI    4. Dyslipidemia : continue current meds. Patient is educated about benefits and adverse effects of statins and explained how benefits outweigh risk. 5. use of aspirin to prevent MI and TIA's discussed      6. Diabetic complications :     A. Retinopathy-NO   educated on this complication,  regular f/u with ophthalmologist encouraged    B. Nephropathy - NO       C. Peripheral Neuropathy - YES  , mild  educated on this disease and indicated improvement with good and stable glycemic control      D : pad - HAD STENTS IN  LE       E. Cad   - f/u with Dr. Gerard Galazra  -  4  tias       7. ADRENAL NODULE - YET TO FIND   On imaging studies       8. LUNG NODULE 7 MM - FEB 2021        9.  THYROID NODULE  - 1 CM IN 2016 ON THE USG   WILL ORDER  US[facility]               > 50 % of time is spent on counseling   Patient voiced understanding her plan of care

## 2021-05-25 ENCOUNTER — HOSPITAL ENCOUNTER (OUTPATIENT)
Dept: CT IMAGING | Age: 56
Discharge: HOME | End: 2021-05-25
Attending: INTERNAL MEDICINE
Payer: MEDICARE

## 2021-05-25 ENCOUNTER — HOSPITAL ENCOUNTER (OUTPATIENT)
Dept: ULTRASOUND IMAGING | Age: 56
Discharge: HOME | End: 2021-05-25
Attending: INTERNAL MEDICINE
Payer: MEDICARE

## 2021-05-25 DIAGNOSIS — E11.65 UNCONTROLLED TYPE 2 DIABETES MELLITUS WITH HYPERGLYCEMIA, WITH LONG-TERM CURRENT USE OF INSULIN (HCC): ICD-10-CM

## 2021-05-25 DIAGNOSIS — R73.09 ELEVATED HEMOGLOBIN A1C: ICD-10-CM

## 2021-05-25 DIAGNOSIS — I10 ESSENTIAL HYPERTENSION: ICD-10-CM

## 2021-05-25 DIAGNOSIS — Z79.4 UNCONTROLLED TYPE 2 DIABETES MELLITUS WITH HYPERGLYCEMIA, WITH LONG-TERM CURRENT USE OF INSULIN (HCC): ICD-10-CM

## 2021-05-25 DIAGNOSIS — R91.8 LUNG NODULES: ICD-10-CM

## 2021-05-25 DIAGNOSIS — R06.02 SHORTNESS OF BREATH: ICD-10-CM

## 2021-05-25 DIAGNOSIS — E04.2 MULTINODULAR GOITER: ICD-10-CM

## 2021-05-25 PROCEDURE — 76536 US EXAM OF HEAD AND NECK: CPT

## 2021-05-25 PROCEDURE — 71250 CT THORAX DX C-: CPT

## 2022-01-18 ENCOUNTER — TRANSCRIBE ORDER (OUTPATIENT)
Dept: SCHEDULING | Age: 57
End: 2022-01-18

## 2022-01-18 DIAGNOSIS — J44.9 COPD (CHRONIC OBSTRUCTIVE PULMONARY DISEASE) (HCC): Primary | ICD-10-CM

## 2022-01-18 DIAGNOSIS — R91.8 LUNG MASS: ICD-10-CM

## 2022-02-10 ENCOUNTER — HOSPITAL ENCOUNTER (OUTPATIENT)
Dept: CT IMAGING | Age: 57
Discharge: HOME | End: 2022-02-10
Attending: INTERNAL MEDICINE

## 2022-02-10 DIAGNOSIS — J44.9 COPD (CHRONIC OBSTRUCTIVE PULMONARY DISEASE) (HCC): ICD-10-CM

## 2022-02-10 DIAGNOSIS — R91.8 LUNG MASS: ICD-10-CM

## 2022-04-26 ENCOUNTER — HOSPITAL ENCOUNTER (OUTPATIENT)
Dept: CT IMAGING | Age: 57
Discharge: HOME | End: 2022-04-26
Attending: INTERNAL MEDICINE
Payer: MEDICARE

## 2022-04-26 PROCEDURE — 71250 CT THORAX DX C-: CPT

## 2022-10-25 ENCOUNTER — TRANSCRIBE ORDER (OUTPATIENT)
Dept: SCHEDULING | Age: 57
End: 2022-10-25

## 2022-10-25 DIAGNOSIS — I73.9 CLAUDICATION, INTERMITTENT (HCC): Primary | ICD-10-CM

## 2022-11-11 ENCOUNTER — TRANSCRIBE ORDER (OUTPATIENT)
Dept: SCHEDULING | Age: 57
End: 2022-11-11

## 2022-11-11 DIAGNOSIS — R91.8 LUNG MASS: Primary | ICD-10-CM

## 2022-11-14 ENCOUNTER — HOSPITAL ENCOUNTER (OUTPATIENT)
Dept: NON INVASIVE DIAGNOSTICS | Age: 57
Discharge: HOME | End: 2022-11-14
Attending: INTERNAL MEDICINE
Payer: MEDICARE

## 2022-11-14 DIAGNOSIS — I73.9 CLAUDICATION, INTERMITTENT (HCC): ICD-10-CM

## 2022-11-14 LAB
LEFT ABI: 1.01
LEFT ARM BP: 173 MMHG
LEFT POSTERIOR TIBIAL: 174 MMHG
LEFT TBI: 0.62
LEFT TOE PRESSURE: 107 MMHG
RIGHT ABI: 1.12
RIGHT ARM BP: 161 MMHG
RIGHT POSTERIOR TIBIAL: 193 MMHG
RIGHT TBI: 0.65
RIGHT TOE PRESSURE: 113 MMHG
VAS LEFT DORSALIS PEDIS BP: 173 MMHG
VAS RIGHT DORSALIS PEDIS BP: 189 MMHG

## 2022-11-14 PROCEDURE — 93923 UPR/LXTR ART STDY 3+ LVLS: CPT

## 2022-11-17 ENCOUNTER — HOSPITAL ENCOUNTER (OUTPATIENT)
Dept: CT IMAGING | Age: 57
Discharge: HOME | End: 2022-11-17
Payer: MEDICARE

## 2022-11-17 DIAGNOSIS — R91.8 LUNG MASS: ICD-10-CM

## 2022-11-17 LAB
BUN SERPL-MCNC: 18 MG/DL (ref 6–20)
CREAT SERPL-MCNC: 0.71 MG/DL (ref 0.55–1.02)

## 2022-11-17 PROCEDURE — 74011000636 HC RX REV CODE- 636

## 2022-11-17 PROCEDURE — 71260 CT THORAX DX C+: CPT

## 2022-11-17 PROCEDURE — 84520 ASSAY OF UREA NITROGEN: CPT

## 2022-11-17 PROCEDURE — 82565 ASSAY OF CREATININE: CPT

## 2022-11-17 RX ADMIN — IOPAMIDOL 100 ML: 755 INJECTION, SOLUTION INTRAVENOUS at 14:27

## 2023-04-20 ENCOUNTER — HOSPITAL ENCOUNTER (OUTPATIENT)
Dept: CT IMAGING | Age: 58
Discharge: HOME | End: 2023-04-20
Payer: MEDICARE

## 2023-04-20 VITALS — WEIGHT: 147 LBS | HEIGHT: 63 IN | BODY MASS INDEX: 26.05 KG/M2

## 2023-04-20 DIAGNOSIS — Z87.891 FORMER SMOKER: ICD-10-CM

## 2023-04-20 PROCEDURE — 71271 CT THORAX LUNG CANCER SCR C-: CPT

## 2023-04-27 ENCOUNTER — TRANSCRIBE ORDERS (OUTPATIENT)
Facility: HOSPITAL | Age: 58
End: 2023-04-27

## 2023-04-27 DIAGNOSIS — R91.8 LUNG MASS: Primary | ICD-10-CM

## 2023-04-28 ENCOUNTER — TRANSCRIBE ORDER (OUTPATIENT)
Dept: SCHEDULING | Age: 58
End: 2023-04-28

## 2023-04-28 DIAGNOSIS — R91.8 LUNG MASS: Primary | ICD-10-CM

## 2023-05-06 ENCOUNTER — HOSPITAL ENCOUNTER (OUTPATIENT)
Facility: HOSPITAL | Age: 58
Discharge: HOME | End: 2023-05-09
Payer: COMMERCIAL

## 2023-05-06 DIAGNOSIS — R91.8 LUNG MASS: ICD-10-CM

## 2023-05-06 PROCEDURE — 78814 PET IMAGE W/CT LMTD: CPT

## 2023-05-06 RX ADMIN — FLUDEOXYGLUCOSE F-18 9.04 MILLICURIE: 300 INJECTION INTRAVENOUS at 09:52

## 2023-05-08 DIAGNOSIS — R91.8 LUNG MASS: Primary | ICD-10-CM

## 2023-05-08 PROCEDURE — 6360000002 HC RX W HCPCS

## 2023-05-08 PROCEDURE — A9552 F18 FDG: HCPCS

## 2023-05-08 RX ORDER — FLUDEOXYGLUCOSE F-18 300 MCI/ML
9.04 INJECTION INTRAVENOUS ONCE
Status: COMPLETED | OUTPATIENT
Start: 2023-05-08 | End: 2023-05-06

## 2023-05-16 ENCOUNTER — TRANSCRIBE ORDERS (OUTPATIENT)
Facility: HOSPITAL | Age: 58
End: 2023-05-16

## 2023-05-16 DIAGNOSIS — R91.8 LUNG MASS: Primary | ICD-10-CM

## 2024-02-01 ENCOUNTER — TRANSCRIBE ORDERS (OUTPATIENT)
Facility: HOSPITAL | Age: 59
End: 2024-02-01

## 2024-02-01 DIAGNOSIS — R13.10 DYSPHAGIA, UNSPECIFIED TYPE: Primary | ICD-10-CM

## 2024-02-07 ENCOUNTER — HOSPITAL ENCOUNTER (OUTPATIENT)
Facility: HOSPITAL | Age: 59
Discharge: HOME | End: 2024-02-10
Payer: COMMERCIAL

## 2024-02-07 DIAGNOSIS — R13.10 DYSPHAGIA, UNSPECIFIED TYPE: ICD-10-CM

## 2024-02-07 PROCEDURE — 74221 X-RAY XM ESOPHAGUS 2CNTRST: CPT

## 2024-02-07 PROCEDURE — 2500000003 HC RX 250 WO HCPCS

## 2024-02-07 PROCEDURE — 74220 X-RAY XM ESOPHAGUS 1CNTRST: CPT

## 2024-02-07 PROCEDURE — 6370000000 HC RX 637 (ALT 250 FOR IP)

## 2024-02-07 RX ADMIN — BARIUM SULFATE 177.5 ML: 0.6 SUSPENSION ORAL at 11:06

## 2024-02-07 RX ADMIN — BARIUM SULFATE 1 TABLET: 700 TABLET ORAL at 11:06

## 2024-02-07 RX ADMIN — ANTACID/ANTIFLATULENT 1 EACH: 380; 550; 10; 10 GRANULE, EFFERVESCENT ORAL at 11:06

## 2024-02-07 RX ADMIN — BARIUM SULFATE 140 ML: 980 POWDER, FOR SUSPENSION ORAL at 11:07

## 2024-05-14 ENCOUNTER — TRANSCRIBE ORDERS (OUTPATIENT)
Facility: HOSPITAL | Age: 59
End: 2024-05-14

## 2024-05-14 DIAGNOSIS — R91.8 LUNG MASS: Primary | ICD-10-CM

## 2024-06-07 ENCOUNTER — HOSPITAL ENCOUNTER (OUTPATIENT)
Facility: HOSPITAL | Age: 59
End: 2024-06-07
Payer: MEDICARE

## 2024-06-07 DIAGNOSIS — R91.8 LUNG MASS: ICD-10-CM

## 2024-06-07 PROCEDURE — 71271 CT THORAX LUNG CANCER SCR C-: CPT

## 2025-06-10 ENCOUNTER — TRANSCRIBE ORDERS (OUTPATIENT)
Facility: HOSPITAL | Age: 60
End: 2025-06-10

## 2025-06-10 DIAGNOSIS — R91.1 SOLITARY PULMONARY NODULE: ICD-10-CM

## 2025-06-10 DIAGNOSIS — Z12.31 VISIT FOR SCREENING MAMMOGRAM: Primary | ICD-10-CM

## 2025-06-20 ENCOUNTER — HOSPITAL ENCOUNTER (EMERGENCY)
Facility: HOSPITAL | Age: 60
Discharge: HOME | End: 2025-06-20
Attending: EMERGENCY MEDICINE
Payer: MEDICARE

## 2025-06-20 ENCOUNTER — APPOINTMENT (OUTPATIENT)
Facility: HOSPITAL | Age: 60
End: 2025-06-20
Payer: MEDICARE

## 2025-06-20 VITALS
BODY MASS INDEX: 24.1 KG/M2 | HEIGHT: 63 IN | OXYGEN SATURATION: 99 % | HEART RATE: 93 BPM | RESPIRATION RATE: 16 BRPM | TEMPERATURE: 98.3 F | DIASTOLIC BLOOD PRESSURE: 80 MMHG | SYSTOLIC BLOOD PRESSURE: 193 MMHG | WEIGHT: 136 LBS

## 2025-06-20 DIAGNOSIS — K59.00 CONSTIPATION, UNSPECIFIED CONSTIPATION TYPE: Primary | ICD-10-CM

## 2025-06-20 LAB
ALBUMIN SERPL-MCNC: 3.2 G/DL (ref 3.5–5)
ALBUMIN/GLOB SERPL: 0.9 (ref 1.1–2.2)
ALP SERPL-CCNC: 120 U/L (ref 45–117)
ALT SERPL-CCNC: 18 U/L (ref 12–78)
ANION GAP SERPL CALC-SCNC: 8 MMOL/L (ref 2–12)
AST SERPL W P-5'-P-CCNC: 12 U/L (ref 15–37)
BASOPHILS # BLD: 0.03 K/UL (ref 0–0.1)
BASOPHILS NFR BLD: 0.3 % (ref 0–1)
BILIRUB SERPL-MCNC: 0.3 MG/DL (ref 0.2–1)
BUN SERPL-MCNC: 19 MG/DL (ref 6–20)
BUN/CREAT SERPL: 15 (ref 12–20)
CA-I BLD-MCNC: 8.7 MG/DL (ref 8.5–10.1)
CHLORIDE SERPL-SCNC: 105 MMOL/L (ref 97–108)
CO2 SERPL-SCNC: 26 MMOL/L (ref 21–32)
CREAT SERPL-MCNC: 1.23 MG/DL (ref 0.55–1.02)
DIFFERENTIAL METHOD BLD: ABNORMAL
EOSINOPHIL # BLD: 0.21 K/UL (ref 0–0.4)
EOSINOPHIL NFR BLD: 1.8 % (ref 0–7)
ERYTHROCYTE [DISTWIDTH] IN BLOOD BY AUTOMATED COUNT: 13.2 % (ref 11.5–14.5)
GLOBULIN SER CALC-MCNC: 3.5 G/DL (ref 2–4)
GLUCOSE SERPL-MCNC: 207 MG/DL (ref 65–100)
HCT VFR BLD AUTO: 30.2 % (ref 35–47)
HGB BLD-MCNC: 9.6 G/DL (ref 11.5–16)
IMM GRANULOCYTES # BLD AUTO: 0.04 K/UL (ref 0–0.04)
IMM GRANULOCYTES NFR BLD AUTO: 0.3 % (ref 0–0.5)
LACTATE BLD-SCNC: 2.15 MMOL/L (ref 0.4–2)
LYMPHOCYTES # BLD: 1.34 K/UL (ref 0.8–3.5)
LYMPHOCYTES NFR BLD: 11.5 % (ref 12–49)
MCH RBC QN AUTO: 27.6 PG (ref 26–34)
MCHC RBC AUTO-ENTMCNC: 31.8 G/DL (ref 30–36.5)
MCV RBC AUTO: 86.8 FL (ref 80–99)
MONOCYTES # BLD: 1.15 K/UL (ref 0–1)
MONOCYTES NFR BLD: 9.8 % (ref 5–13)
NEUTS SEG # BLD: 8.91 K/UL (ref 1.8–8)
NEUTS SEG NFR BLD: 76.3 % (ref 32–75)
PERFORMED BY:: ABNORMAL
PLATELET # BLD AUTO: 181 K/UL (ref 150–400)
PMV BLD AUTO: 11.2 FL (ref 8.9–12.9)
POTASSIUM SERPL-SCNC: 4 MMOL/L (ref 3.5–5.1)
PROT SERPL-MCNC: 6.7 G/DL (ref 6.4–8.2)
RBC # BLD AUTO: 3.48 M/UL (ref 3.8–5.2)
SODIUM SERPL-SCNC: 139 MMOL/L (ref 136–145)
WBC # BLD AUTO: 11.7 K/UL (ref 3.6–11)

## 2025-06-20 PROCEDURE — 83605 ASSAY OF LACTIC ACID: CPT

## 2025-06-20 PROCEDURE — 99285 EMERGENCY DEPT VISIT HI MDM: CPT

## 2025-06-20 PROCEDURE — 80053 COMPREHEN METABOLIC PANEL: CPT

## 2025-06-20 PROCEDURE — 74177 CT ABD & PELVIS W/CONTRAST: CPT

## 2025-06-20 PROCEDURE — 36415 COLL VENOUS BLD VENIPUNCTURE: CPT

## 2025-06-20 PROCEDURE — 85025 COMPLETE CBC W/AUTO DIFF WBC: CPT

## 2025-06-20 PROCEDURE — 6360000004 HC RX CONTRAST MEDICATION: Performed by: EMERGENCY MEDICINE

## 2025-06-20 RX ORDER — TRAMADOL HYDROCHLORIDE 50 MG/1
50 TABLET ORAL EVERY 4 HOURS PRN
Qty: 18 TABLET | Refills: 0 | Status: ON HOLD | OUTPATIENT
Start: 2025-06-20 | End: 2025-06-23

## 2025-06-20 RX ORDER — IOPAMIDOL 755 MG/ML
100 INJECTION, SOLUTION INTRAVASCULAR ONCE
Status: COMPLETED | OUTPATIENT
Start: 2025-06-20 | End: 2025-06-20

## 2025-06-20 RX ORDER — MAGNESIUM CARB/ALUMINUM HYDROX 105-160MG
296 TABLET,CHEWABLE ORAL ONCE
Qty: 296 ML | Refills: 1 | Status: SHIPPED | OUTPATIENT
Start: 2025-06-20 | End: 2025-06-20

## 2025-06-20 RX ORDER — TRAMADOL HYDROCHLORIDE 50 MG/1
50 TABLET ORAL ONCE
Status: DISCONTINUED | OUTPATIENT
Start: 2025-06-20 | End: 2025-06-20 | Stop reason: HOSPADM

## 2025-06-20 RX ADMIN — IOPAMIDOL 100 ML: 755 INJECTION, SOLUTION INTRAVENOUS at 17:38

## 2025-06-20 ASSESSMENT — PAIN DESCRIPTION - ORIENTATION: ORIENTATION: LOWER

## 2025-06-20 ASSESSMENT — PAIN - FUNCTIONAL ASSESSMENT: PAIN_FUNCTIONAL_ASSESSMENT: 0-10

## 2025-06-20 ASSESSMENT — PAIN SCALES - GENERAL
PAINLEVEL_OUTOF10: 7
PAINLEVEL_OUTOF10: 7

## 2025-06-20 ASSESSMENT — PAIN DESCRIPTION - LOCATION: LOCATION: BACK

## 2025-06-20 NOTE — ED PROVIDER NOTES
[facility] EMERGENCY DEPT  EMERGENCY DEPARTMENT HISTORY AND PHYSICAL EXAM      Date of evaluation: 2025  Patient Name: Eliz Villalba  Birthdate 1965  MRN: [id number]  ED Provider: Matt Catherine MD   Note Started: 5:01 PM EDT 25    HISTORY OF PRESENT ILLNESS     Chief Complaint   Patient presents with    Post-op Problem       History Provided By: Patient, only     HPI: Eliz Villalba is a 59 y.o. female who is a few days status post left femoral heart catheterization presents with left lower quadrant and back pain.  She denies any fever, chills she says she has increased pressure and pain but they are not relieved with analgesic.  She denies numbness or tingling says that she is on Plavix.    PAST MEDICAL HISTORY   Past Medical History:  Past Medical History:   Diagnosis Date    CAD (coronary artery disease)     Diabetes (HCC)     Fibromyalgia     Hyperlipidemia     Hypertension     Lupus (HCC)     Neuropathy     Stroke (HCC) 2015       Past Surgical History:  Past Surgical History:   Procedure Laterality Date    CHOLECYSTECTOMY      PARTIAL HYSTERECTOMY (CERVIX NOT REMOVED)      TONSILLECTOMY      TUBAL LIGATION      VASCULAR SURGERY Bilateral             Family History:  Family History   Problem Relation Age of Onset    Cancer Mother     Cancer Father        Social History:  Social History     Tobacco Use    Smoking status: Former     Current packs/day: 0.00     Types: Cigarettes     Quit date: 2017     Years since quittin.0    Smokeless tobacco: Never   Substance Use Topics    Alcohol use: Yes     Alcohol/week: 0.0 standard drinks of alcohol    Drug use: Never       Allergies:  Allergies   Allergen Reactions    Codeine Hives    Percocet [Oxycodone-Acetaminophen] Hives    Sulfa Antibiotics Other (See Comments)     Blisters      Trazodone Other (See Comments)     Makes pt angry    Lyrica [Pregabalin] Palpitations    Metformin Nausea And Vomiting       PCP: Val Perez APRN -

## 2025-06-22 ENCOUNTER — HOSPITAL ENCOUNTER (INPATIENT)
Facility: HOSPITAL | Age: 60
LOS: 2 days | Discharge: HOME | End: 2025-06-24
Attending: EMERGENCY MEDICINE | Admitting: INTERNAL MEDICINE
Payer: MEDICARE

## 2025-06-22 ENCOUNTER — APPOINTMENT (OUTPATIENT)
Facility: HOSPITAL | Age: 60
End: 2025-06-22
Payer: MEDICARE

## 2025-06-22 DIAGNOSIS — J18.9 PNEUMONIA OF BOTH LUNGS DUE TO INFECTIOUS ORGANISM, UNSPECIFIED PART OF LUNG: ICD-10-CM

## 2025-06-22 DIAGNOSIS — J90 PLEURAL EFFUSION: ICD-10-CM

## 2025-06-22 DIAGNOSIS — J96.01 ACUTE RESPIRATORY FAILURE WITH HYPOXIA (HCC): Primary | ICD-10-CM

## 2025-06-22 DIAGNOSIS — I21.4 NSTEMI (NON-ST ELEVATED MYOCARDIAL INFARCTION) (HCC): ICD-10-CM

## 2025-06-22 DIAGNOSIS — I50.9 ACUTE CONGESTIVE HEART FAILURE, UNSPECIFIED HEART FAILURE TYPE (HCC): ICD-10-CM

## 2025-06-22 LAB
ALBUMIN SERPL-MCNC: 2.9 G/DL (ref 3.5–5)
ALBUMIN/GLOB SERPL: 0.8 (ref 1.1–2.2)
ALP SERPL-CCNC: 118 U/L (ref 45–117)
ALT SERPL-CCNC: 17 U/L (ref 12–78)
ANION GAP SERPL CALC-SCNC: 11 MMOL/L (ref 2–12)
AST SERPL W P-5'-P-CCNC: 22 U/L (ref 15–37)
BASOPHILS # BLD: 0.03 K/UL (ref 0–0.1)
BASOPHILS NFR BLD: 0.2 % (ref 0–1)
BILIRUB SERPL-MCNC: 0.4 MG/DL (ref 0.2–1)
BNP SERPL-MCNC: 7027 PG/ML
BUN SERPL-MCNC: 25 MG/DL (ref 6–20)
BUN/CREAT SERPL: 20 (ref 12–20)
CA-I BLD-MCNC: 8.9 MG/DL (ref 8.5–10.1)
CHLORIDE SERPL-SCNC: 105 MMOL/L (ref 97–108)
CO2 SERPL-SCNC: 24 MMOL/L (ref 21–32)
CREAT SERPL-MCNC: 1.23 MG/DL (ref 0.55–1.02)
DIFFERENTIAL METHOD BLD: ABNORMAL
EOSINOPHIL # BLD: 0.05 K/UL (ref 0–0.4)
EOSINOPHIL NFR BLD: 0.4 % (ref 0–7)
ERYTHROCYTE [DISTWIDTH] IN BLOOD BY AUTOMATED COUNT: 13.1 % (ref 11.5–14.5)
GLOBULIN SER CALC-MCNC: 3.8 G/DL (ref 2–4)
GLUCOSE BLD STRIP.AUTO-MCNC: 234 MG/DL (ref 65–100)
GLUCOSE BLD STRIP.AUTO-MCNC: 253 MG/DL (ref 65–100)
GLUCOSE SERPL-MCNC: 298 MG/DL (ref 65–100)
HCT VFR BLD AUTO: 28.6 % (ref 35–47)
HGB BLD-MCNC: 9 G/DL (ref 11.5–16)
IMM GRANULOCYTES # BLD AUTO: 0.07 K/UL (ref 0–0.04)
IMM GRANULOCYTES NFR BLD AUTO: 0.5 % (ref 0–0.5)
LYMPHOCYTES # BLD: 1.07 K/UL (ref 0.8–3.5)
LYMPHOCYTES NFR BLD: 7.9 % (ref 12–49)
MAGNESIUM SERPL-MCNC: 1.5 MG/DL (ref 1.6–2.4)
MCH RBC QN AUTO: 27.1 PG (ref 26–34)
MCHC RBC AUTO-ENTMCNC: 31.5 G/DL (ref 30–36.5)
MCV RBC AUTO: 86.1 FL (ref 80–99)
MONOCYTES # BLD: 0.96 K/UL (ref 0–1)
MONOCYTES NFR BLD: 7.1 % (ref 5–13)
NEUTS SEG # BLD: 11.31 K/UL (ref 1.8–8)
NEUTS SEG NFR BLD: 83.9 % (ref 32–75)
PERFORMED BY:: ABNORMAL
PERFORMED BY:: ABNORMAL
PLATELET # BLD AUTO: 191 K/UL (ref 150–400)
PMV BLD AUTO: 11.6 FL (ref 8.9–12.9)
POTASSIUM SERPL-SCNC: 4.5 MMOL/L (ref 3.5–5.1)
PROCALCITONIN SERPL-MCNC: <0.05 NG/ML
PROCALCITONIN SERPL-MCNC: <0.05 NG/ML
PROT SERPL-MCNC: 6.7 G/DL (ref 6.4–8.2)
RBC # BLD AUTO: 3.32 M/UL (ref 3.8–5.2)
SODIUM SERPL-SCNC: 140 MMOL/L (ref 136–145)
TROPONIN I SERPL HS-MCNC: 1385 NG/L (ref 0–51)
TROPONIN I SERPL HS-MCNC: 779 NG/L (ref 0–51)
TROPONIN I SERPL HS-MCNC: 943 NG/L (ref 0–51)
TSH SERPL DL<=0.05 MIU/L-ACNC: 2.9 UIU/ML (ref 0.36–3.74)
WBC # BLD AUTO: 13.5 K/UL (ref 3.6–11)

## 2025-06-22 PROCEDURE — 83036 HEMOGLOBIN GLYCOSYLATED A1C: CPT

## 2025-06-22 PROCEDURE — 84484 ASSAY OF TROPONIN QUANT: CPT

## 2025-06-22 PROCEDURE — 82962 GLUCOSE BLOOD TEST: CPT

## 2025-06-22 PROCEDURE — 83735 ASSAY OF MAGNESIUM: CPT

## 2025-06-22 PROCEDURE — 36415 COLL VENOUS BLD VENIPUNCTURE: CPT

## 2025-06-22 PROCEDURE — 6360000002 HC RX W HCPCS: Performed by: EMERGENCY MEDICINE

## 2025-06-22 PROCEDURE — 82728 ASSAY OF FERRITIN: CPT

## 2025-06-22 PROCEDURE — 6360000004 HC RX CONTRAST MEDICATION: Performed by: EMERGENCY MEDICINE

## 2025-06-22 PROCEDURE — 71275 CT ANGIOGRAPHY CHEST: CPT

## 2025-06-22 PROCEDURE — 2500000003 HC RX 250 WO HCPCS: Performed by: EMERGENCY MEDICINE

## 2025-06-22 PROCEDURE — 2060000000 HC ICU INTERMEDIATE R&B

## 2025-06-22 PROCEDURE — 6370000000 HC RX 637 (ALT 250 FOR IP): Performed by: NURSE PRACTITIONER

## 2025-06-22 PROCEDURE — 2580000003 HC RX 258: Performed by: INTERNAL MEDICINE

## 2025-06-22 PROCEDURE — 2580000003 HC RX 258: Performed by: EMERGENCY MEDICINE

## 2025-06-22 PROCEDURE — 99285 EMERGENCY DEPT VISIT HI MDM: CPT

## 2025-06-22 PROCEDURE — 84145 PROCALCITONIN (PCT): CPT

## 2025-06-22 PROCEDURE — 6360000002 HC RX W HCPCS: Performed by: INTERNAL MEDICINE

## 2025-06-22 PROCEDURE — 84439 ASSAY OF FREE THYROXINE: CPT

## 2025-06-22 PROCEDURE — 85025 COMPLETE CBC W/AUTO DIFF WBC: CPT

## 2025-06-22 PROCEDURE — 2500000003 HC RX 250 WO HCPCS: Performed by: INTERNAL MEDICINE

## 2025-06-22 PROCEDURE — 6370000000 HC RX 637 (ALT 250 FOR IP): Performed by: INTERNAL MEDICINE

## 2025-06-22 PROCEDURE — 83880 ASSAY OF NATRIURETIC PEPTIDE: CPT

## 2025-06-22 PROCEDURE — 94760 N-INVAS EAR/PLS OXIMETRY 1: CPT

## 2025-06-22 PROCEDURE — 6370000000 HC RX 637 (ALT 250 FOR IP): Performed by: EMERGENCY MEDICINE

## 2025-06-22 PROCEDURE — 80053 COMPREHEN METABOLIC PANEL: CPT

## 2025-06-22 PROCEDURE — 93005 ELECTROCARDIOGRAM TRACING: CPT | Performed by: EMERGENCY MEDICINE

## 2025-06-22 PROCEDURE — 84443 ASSAY THYROID STIM HORMONE: CPT

## 2025-06-22 RX ORDER — LISINOPRIL 5 MG/1
5 TABLET ORAL DAILY
Status: DISCONTINUED | OUTPATIENT
Start: 2025-06-22 | End: 2025-06-24 | Stop reason: HOSPADM

## 2025-06-22 RX ORDER — ISOSORBIDE MONONITRATE 60 MG/1
60 TABLET, EXTENDED RELEASE ORAL DAILY
COMMUNITY
Start: 2025-06-10

## 2025-06-22 RX ORDER — ASPIRIN 81 MG/1
81 TABLET, CHEWABLE ORAL DAILY
Status: DISCONTINUED | OUTPATIENT
Start: 2025-06-23 | End: 2025-06-24 | Stop reason: HOSPADM

## 2025-06-22 RX ORDER — FUROSEMIDE 10 MG/ML
40 INJECTION INTRAMUSCULAR; INTRAVENOUS 2 TIMES DAILY
Status: DISCONTINUED | OUTPATIENT
Start: 2025-06-22 | End: 2025-06-24

## 2025-06-22 RX ORDER — ONDANSETRON 4 MG/1
4 TABLET, ORALLY DISINTEGRATING ORAL EVERY 8 HOURS PRN
Status: DISCONTINUED | OUTPATIENT
Start: 2025-06-22 | End: 2025-06-24 | Stop reason: HOSPADM

## 2025-06-22 RX ORDER — ONDANSETRON 2 MG/ML
4 INJECTION INTRAMUSCULAR; INTRAVENOUS EVERY 6 HOURS PRN
Status: DISCONTINUED | OUTPATIENT
Start: 2025-06-22 | End: 2025-06-24 | Stop reason: HOSPADM

## 2025-06-22 RX ORDER — ISOSORBIDE MONONITRATE 60 MG/1
60 TABLET, EXTENDED RELEASE ORAL DAILY
Status: DISCONTINUED | OUTPATIENT
Start: 2025-06-23 | End: 2025-06-24 | Stop reason: HOSPADM

## 2025-06-22 RX ORDER — DULOXETIN HYDROCHLORIDE 30 MG/1
90 CAPSULE, DELAYED RELEASE ORAL NIGHTLY
Status: DISCONTINUED | OUTPATIENT
Start: 2025-06-22 | End: 2025-06-24 | Stop reason: HOSPADM

## 2025-06-22 RX ORDER — GLIPIZIDE 10 MG/1
10 TABLET ORAL EVERY MORNING
COMMUNITY
Start: 2025-05-12

## 2025-06-22 RX ORDER — IOPAMIDOL 755 MG/ML
100 INJECTION, SOLUTION INTRAVASCULAR ONCE
Status: COMPLETED | OUTPATIENT
Start: 2025-06-22 | End: 2025-06-22

## 2025-06-22 RX ORDER — PANTOPRAZOLE SODIUM 40 MG/1
40 TABLET, DELAYED RELEASE ORAL
Status: DISCONTINUED | OUTPATIENT
Start: 2025-06-23 | End: 2025-06-24 | Stop reason: HOSPADM

## 2025-06-22 RX ORDER — DULOXETIN HYDROCHLORIDE 60 MG/1
1 CAPSULE, DELAYED RELEASE ORAL NIGHTLY
COMMUNITY

## 2025-06-22 RX ORDER — POTASSIUM CHLORIDE 1500 MG/1
40 TABLET, EXTENDED RELEASE ORAL PRN
Status: DISCONTINUED | OUTPATIENT
Start: 2025-06-22 | End: 2025-06-24 | Stop reason: HOSPADM

## 2025-06-22 RX ORDER — PANTOPRAZOLE SODIUM 20 MG/1
TABLET, DELAYED RELEASE ORAL
COMMUNITY

## 2025-06-22 RX ORDER — SODIUM CHLORIDE 0.9 % (FLUSH) 0.9 %
5-40 SYRINGE (ML) INJECTION EVERY 12 HOURS SCHEDULED
Status: DISCONTINUED | OUTPATIENT
Start: 2025-06-22 | End: 2025-06-24 | Stop reason: HOSPADM

## 2025-06-22 RX ORDER — ACETAMINOPHEN 650 MG/1
650 SUPPOSITORY RECTAL EVERY 6 HOURS PRN
Status: DISCONTINUED | OUTPATIENT
Start: 2025-06-22 | End: 2025-06-24 | Stop reason: HOSPADM

## 2025-06-22 RX ORDER — POTASSIUM CHLORIDE 7.45 MG/ML
10 INJECTION INTRAVENOUS PRN
Status: DISCONTINUED | OUTPATIENT
Start: 2025-06-22 | End: 2025-06-24 | Stop reason: HOSPADM

## 2025-06-22 RX ORDER — SODIUM CHLORIDE 9 MG/ML
INJECTION, SOLUTION INTRAVENOUS PRN
Status: DISCONTINUED | OUTPATIENT
Start: 2025-06-22 | End: 2025-06-24 | Stop reason: HOSPADM

## 2025-06-22 RX ORDER — DEXTROSE MONOHYDRATE 100 MG/ML
INJECTION, SOLUTION INTRAVENOUS CONTINUOUS PRN
Status: DISCONTINUED | OUTPATIENT
Start: 2025-06-22 | End: 2025-06-24 | Stop reason: HOSPADM

## 2025-06-22 RX ORDER — ASPIRIN 81 MG/1
1 TABLET ORAL DAILY
COMMUNITY

## 2025-06-22 RX ORDER — SODIUM CHLORIDE 0.9 % (FLUSH) 0.9 %
5-40 SYRINGE (ML) INJECTION PRN
Status: DISCONTINUED | OUTPATIENT
Start: 2025-06-22 | End: 2025-06-24 | Stop reason: HOSPADM

## 2025-06-22 RX ORDER — CLOPIDOGREL BISULFATE 75 MG/1
75 TABLET ORAL DAILY
Status: DISCONTINUED | OUTPATIENT
Start: 2025-06-23 | End: 2025-06-24 | Stop reason: HOSPADM

## 2025-06-22 RX ORDER — MAGNESIUM SULFATE IN WATER 40 MG/ML
2000 INJECTION, SOLUTION INTRAVENOUS PRN
Status: DISCONTINUED | OUTPATIENT
Start: 2025-06-22 | End: 2025-06-24 | Stop reason: HOSPADM

## 2025-06-22 RX ORDER — GLUCAGON 1 MG/ML
1 KIT INJECTION PRN
Status: DISCONTINUED | OUTPATIENT
Start: 2025-06-22 | End: 2025-06-24 | Stop reason: HOSPADM

## 2025-06-22 RX ORDER — POLYETHYLENE GLYCOL 3350 17 G/17G
17 POWDER, FOR SOLUTION ORAL DAILY PRN
Status: DISCONTINUED | OUTPATIENT
Start: 2025-06-22 | End: 2025-06-24 | Stop reason: HOSPADM

## 2025-06-22 RX ORDER — MAGNESIUM HYDROXIDE/ALUMINUM HYDROXICE/SIMETHICONE 120; 1200; 1200 MG/30ML; MG/30ML; MG/30ML
30 SUSPENSION ORAL EVERY 6 HOURS PRN
Status: DISCONTINUED | OUTPATIENT
Start: 2025-06-22 | End: 2025-06-24 | Stop reason: HOSPADM

## 2025-06-22 RX ORDER — ENOXAPARIN SODIUM 100 MG/ML
40 INJECTION SUBCUTANEOUS DAILY
Status: DISCONTINUED | OUTPATIENT
Start: 2025-06-22 | End: 2025-06-24 | Stop reason: HOSPADM

## 2025-06-22 RX ORDER — FUROSEMIDE 10 MG/ML
40 INJECTION INTRAMUSCULAR; INTRAVENOUS
Status: COMPLETED | OUTPATIENT
Start: 2025-06-22 | End: 2025-06-22

## 2025-06-22 RX ORDER — ATORVASTATIN CALCIUM 80 MG/1
80 TABLET, FILM COATED ORAL EVERY EVENING
COMMUNITY

## 2025-06-22 RX ORDER — TRAMADOL HYDROCHLORIDE 50 MG/1
50 TABLET ORAL EVERY 4 HOURS PRN
Status: DISCONTINUED | OUTPATIENT
Start: 2025-06-22 | End: 2025-06-24 | Stop reason: HOSPADM

## 2025-06-22 RX ORDER — SERTRALINE HYDROCHLORIDE 100 MG/1
100 TABLET, FILM COATED ORAL EVERY EVENING
COMMUNITY
Start: 2025-06-05

## 2025-06-22 RX ORDER — BUSPIRONE HYDROCHLORIDE 10 MG/1
10 TABLET ORAL 2 TIMES DAILY
COMMUNITY

## 2025-06-22 RX ORDER — BUSPIRONE HYDROCHLORIDE 10 MG/1
10 TABLET ORAL 3 TIMES DAILY
Status: DISCONTINUED | OUTPATIENT
Start: 2025-06-22 | End: 2025-06-24 | Stop reason: HOSPADM

## 2025-06-22 RX ORDER — ZOLPIDEM TARTRATE 5 MG/1
10 TABLET ORAL NIGHTLY
Status: DISCONTINUED | OUTPATIENT
Start: 2025-06-22 | End: 2025-06-24 | Stop reason: HOSPADM

## 2025-06-22 RX ORDER — TIRZEPATIDE 2.5 MG/.5ML
2.5 INJECTION, SOLUTION SUBCUTANEOUS
COMMUNITY
Start: 2025-06-03

## 2025-06-22 RX ORDER — CARVEDILOL 6.25 MG/1
6.25 TABLET ORAL 2 TIMES DAILY
COMMUNITY
Start: 2025-04-14 | End: 2026-04-14

## 2025-06-22 RX ORDER — DULOXETIN HYDROCHLORIDE 30 MG/1
60 CAPSULE, DELAYED RELEASE ORAL DAILY
Status: DISCONTINUED | OUTPATIENT
Start: 2025-06-22 | End: 2025-06-22

## 2025-06-22 RX ORDER — ASPIRIN 325 MG
325 TABLET ORAL
Status: COMPLETED | OUTPATIENT
Start: 2025-06-22 | End: 2025-06-22

## 2025-06-22 RX ORDER — ATORVASTATIN CALCIUM 40 MG/1
80 TABLET, FILM COATED ORAL NIGHTLY
Status: DISCONTINUED | OUTPATIENT
Start: 2025-06-22 | End: 2025-06-24 | Stop reason: HOSPADM

## 2025-06-22 RX ORDER — TIZANIDINE 2 MG/1
10 TABLET ORAL 2 TIMES DAILY
Status: DISCONTINUED | OUTPATIENT
Start: 2025-06-22 | End: 2025-06-24 | Stop reason: HOSPADM

## 2025-06-22 RX ORDER — AMLODIPINE BESYLATE 5 MG/1
10 TABLET ORAL DAILY
Status: DISCONTINUED | OUTPATIENT
Start: 2025-06-22 | End: 2025-06-24 | Stop reason: HOSPADM

## 2025-06-22 RX ORDER — CLOPIDOGREL BISULFATE 75 MG/1
75 TABLET ORAL DAILY
COMMUNITY
Start: 2025-04-14 | End: 2026-04-14

## 2025-06-22 RX ORDER — TIZANIDINE 2 MG/1
2 TABLET ORAL 2 TIMES DAILY
COMMUNITY

## 2025-06-22 RX ORDER — ZOLPIDEM TARTRATE 10 MG/1
10 TABLET ORAL NIGHTLY
COMMUNITY

## 2025-06-22 RX ORDER — DULOXETIN HYDROCHLORIDE 30 MG/1
1 CAPSULE, DELAYED RELEASE ORAL NIGHTLY
COMMUNITY

## 2025-06-22 RX ORDER — AMLODIPINE BESYLATE 10 MG/1
10 TABLET ORAL DAILY
COMMUNITY

## 2025-06-22 RX ORDER — INSULIN GLARGINE 100 [IU]/ML
0.25 INJECTION, SOLUTION SUBCUTANEOUS DAILY
Status: DISCONTINUED | OUTPATIENT
Start: 2025-06-22 | End: 2025-06-24 | Stop reason: HOSPADM

## 2025-06-22 RX ORDER — ACETAMINOPHEN 325 MG/1
650 TABLET ORAL EVERY 6 HOURS PRN
Status: DISCONTINUED | OUTPATIENT
Start: 2025-06-22 | End: 2025-06-24 | Stop reason: HOSPADM

## 2025-06-22 RX ORDER — CARVEDILOL 3.12 MG/1
6.25 TABLET ORAL 2 TIMES DAILY
Status: DISCONTINUED | OUTPATIENT
Start: 2025-06-22 | End: 2025-06-24 | Stop reason: HOSPADM

## 2025-06-22 RX ORDER — INSULIN LISPRO 100 [IU]/ML
0-8 INJECTION, SOLUTION INTRAVENOUS; SUBCUTANEOUS
Status: DISCONTINUED | OUTPATIENT
Start: 2025-06-22 | End: 2025-06-24 | Stop reason: HOSPADM

## 2025-06-22 RX ADMIN — CEFTRIAXONE SODIUM 1000 MG: 1 INJECTION, POWDER, FOR SOLUTION INTRAMUSCULAR; INTRAVENOUS at 12:52

## 2025-06-22 RX ADMIN — AZITHROMYCIN MONOHYDRATE 500 MG: 500 INJECTION, POWDER, LYOPHILIZED, FOR SOLUTION INTRAVENOUS at 19:45

## 2025-06-22 RX ADMIN — FUROSEMIDE 40 MG: 10 INJECTION, SOLUTION INTRAMUSCULAR; INTRAVENOUS at 19:38

## 2025-06-22 RX ADMIN — INSULIN LISPRO 4 UNITS: 100 INJECTION, SOLUTION INTRAVENOUS; SUBCUTANEOUS at 20:28

## 2025-06-22 RX ADMIN — CARVEDILOL 6.25 MG: 3.12 TABLET, FILM COATED ORAL at 20:30

## 2025-06-22 RX ADMIN — SODIUM CHLORIDE, PRESERVATIVE FREE 10 ML: 5 INJECTION INTRAVENOUS at 20:30

## 2025-06-22 RX ADMIN — TIZANIDINE 10 MG: 2 TABLET ORAL at 22:34

## 2025-06-22 RX ADMIN — ATORVASTATIN CALCIUM 80 MG: 40 TABLET, FILM COATED ORAL at 20:29

## 2025-06-22 RX ADMIN — ONDANSETRON 4 MG: 2 INJECTION, SOLUTION INTRAMUSCULAR; INTRAVENOUS at 18:36

## 2025-06-22 RX ADMIN — BUSPIRONE HYDROCHLORIDE 10 MG: 10 TABLET ORAL at 20:28

## 2025-06-22 RX ADMIN — ENOXAPARIN SODIUM 40 MG: 100 INJECTION SUBCUTANEOUS at 17:00

## 2025-06-22 RX ADMIN — IOPAMIDOL 100 ML: 755 INJECTION, SOLUTION INTRAVENOUS at 10:57

## 2025-06-22 RX ADMIN — AZITHROMYCIN MONOHYDRATE 500 MG: 500 INJECTION, POWDER, LYOPHILIZED, FOR SOLUTION INTRAVENOUS at 12:56

## 2025-06-22 RX ADMIN — ZOLPIDEM TARTRATE 10 MG: 5 TABLET ORAL at 22:34

## 2025-06-22 RX ADMIN — FUROSEMIDE 40 MG: 10 INJECTION, SOLUTION INTRAMUSCULAR; INTRAVENOUS at 12:48

## 2025-06-22 RX ADMIN — DULOXETINE 90 MG: 30 CAPSULE, DELAYED RELEASE ORAL at 22:34

## 2025-06-22 RX ADMIN — CEFTRIAXONE SODIUM 1000 MG: 1 INJECTION, POWDER, FOR SOLUTION INTRAMUSCULAR; INTRAVENOUS at 17:04

## 2025-06-22 RX ADMIN — ASPIRIN 325 MG: 325 TABLET ORAL at 12:58

## 2025-06-22 RX ADMIN — INSULIN LISPRO 2 UNITS: 100 INJECTION, SOLUTION INTRAVENOUS; SUBCUTANEOUS at 17:14

## 2025-06-22 ASSESSMENT — PAIN SCALES - GENERAL
PAINLEVEL_OUTOF10: 0

## 2025-06-22 ASSESSMENT — HEART SCORE: ECG: NON-SPECIFC REPOLARIZATION DISTURBANCE/LBTB/PM

## 2025-06-22 ASSESSMENT — PAIN - FUNCTIONAL ASSESSMENT: PAIN_FUNCTIONAL_ASSESSMENT: 0-10

## 2025-06-22 NOTE — ED NOTES
ED TO INPATIENT SBAR HANDOFF    Patient Name: Eliz Villalba   Preferred Name: Eliz  : 1965  59 y.o.   Family/Caregiver Present: no   Code Status Order: Full Code  PO Status: NPO:No  Telemetry Order: Yes  C-SSRS: Risk of Suicide: No Risk  Sitter no n/a  Restraints:     Sepsis Risk Score Sepsis V2 Risk Score: 13.7    Situation  Chief Complaint   Patient presents with    Shortness of Breath     Brief Description of Patient's Condition: Started yesterday  Sob, x 1 hr went away  0600 started back today  Sob, sweating has gotten better  No CP  No cold symptoms,   No wheezing noted bilat  Talking in complete sentences  Former smoking  No hx of COPD   Had stent in renal artery Wednesday at [facility]  Mental Status: oriented, alert, coherent, thought processes intact, and able to concentrate and follow conversation  Arrived from:Home  Imaging:   CTA CHEST W WO CONTRAST   Final Result   No pulmonary embolism   Multilobar pneumonia or alveolitis or inflammatory process with tiny bilateral   pleural transudates   Incidental coronary artery disease            Electronically signed by Lina Sarkar        Abnormal labs:   Abnormal Labs Reviewed   CBC WITH AUTO DIFFERENTIAL - Abnormal; Notable for the following components:       Result Value    WBC 13.5 (*)     RBC 3.32 (*)     Hemoglobin 9.0 (*)     Hematocrit 28.6 (*)     Neutrophils % 83.9 (*)     Lymphocytes % 7.9 (*)     Neutrophils Absolute 11.31 (*)     Immature Granulocytes Absolute 0.07 (*)     All other components within normal limits   COMPREHENSIVE METABOLIC PANEL - Abnormal; Notable for the following components:    Glucose 298 (*)     BUN 25 (*)     Creatinine 1.23 (*)     Est, Glom Filt Rate 50 (*)     Alk Phosphatase 118 (*)     Albumin 2.9 (*)     Albumin/Globulin Ratio 0.8 (*)     All other components within normal limits   MAGNESIUM - Abnormal; Notable for the following components:    Magnesium 1.5 (*)     All other components within normal limits   TROPONIN -

## 2025-06-22 NOTE — ED PROVIDER NOTES
[facility] EMERGENCY DEPT  EMERGENCY DEPARTMENT HISTORY AND PHYSICAL EXAM      Date of evaluation: 2025  Patient Name: Eliz Villalba  Birthdate 1965  MRN: [id number]  ED Provider: Angel Mclain DO   Note Started: 10:27 AM EDT 25    HISTORY OF PRESENT ILLNESS     Chief Complaint   Patient presents with   • Shortness of Breath     History Provided By: Patient    HPI: 59-year-old female with history of CAD, diabetes, fibromyalgia, hyperlipidemia, hypertension, and stroke who presents with difficulty breathing and swelling that has happened the last 2 mornings.  Denies chest pain.  No leg swelling.  No history of DVT or PE.  Used to be a smoker but quit 9 years ago.  No history of COPD.    PAST MEDICAL HISTORY   Past Medical History:  Past Medical History:   Diagnosis Date   • CAD (coronary artery disease)    • Diabetes (HCC)    • Fibromyalgia    • Hyperlipidemia    • Hypertension    • Lupus    • Neuropathy    • Stroke (HCC) 2015       Past Surgical History:  Past Surgical History:   Procedure Laterality Date   • CHOLECYSTECTOMY     • PARTIAL HYSTERECTOMY (CERVIX NOT REMOVED)     • TONSILLECTOMY     • TUBAL LIGATION     • VASCULAR SURGERY Bilateral     2020        Family History:  Family History   Problem Relation Age of Onset   • Cancer Mother    • Cancer Father        Social History:  Social History     Tobacco Use   • Smoking status: Former     Current packs/day: 0.00     Types: Cigarettes     Quit date: 2017     Years since quittin.1   • Smokeless tobacco: Never   Substance Use Topics   • Alcohol use: Not Currently   • Drug use: Never       Allergies:  Allergies   Allergen Reactions   • Codeine Hives   • Percocet [Oxycodone-Acetaminophen] Hives   • Sulfa Antibiotics Other (See Comments)     Blisters     • Trazodone Other (See Comments)     Makes pt angry   • Lyrica [Pregabalin] Palpitations   • Metformin Nausea And Vomiting       PCP: Val Perez APRN - NP    Current Meds:   Current

## 2025-06-22 NOTE — H&P
History & Physical    Primary Care Provider: Val Perez APRN - JORGE  Source of Information: Patient/family     Chief complaint:   Chief Complaint   Patient presents with    Shortness of Breath        History of Presenting Illness:   Eliz Villalba is a 59 y.o. female with multiple medical problems including CAD, diabetes, fibromyalgia, hyperlipidemia, hypertension and stroke who presented to the [facility] ED this morning with complaints of shortness of breath and  fluid retention over the past 2 days.  She denies leg edema, however.  No chest pain.  Patient is a prior smoker, no diagnosis of COPD.    In the ED, patient had stable vital signs, was not hypoxic initially.  However when they got her up and ambulated her saturations dropped to 88%.  Her troponin was 779, up trended to 943.  proBNP is 7000.  CTA of the chest was negative for PE but did show multilobar alveolar infiltrates and small pleural effusions.  EKG showed sinus rhythm with isolated T wave inversion in aVL       Review of Systems:  A comprehensive review of systems was negative except for that written in the History of Present Illness.     Past Medical History:   Diagnosis Date    CAD (coronary artery disease)     Diabetes (HCC)     Fibromyalgia     Hyperlipidemia     Hypertension     Lupus     Neuropathy     Stroke (HCC) 12/2015        Past Surgical History:   Procedure Laterality Date    CHOLECYSTECTOMY      PARTIAL HYSTERECTOMY (CERVIX NOT REMOVED)      TONSILLECTOMY      TUBAL LIGATION      VASCULAR SURGERY Bilateral     2020        Prior to Admission medications    Medication Sig Start Date End Date Taking? Authorizing Provider   amLODIPine (NORVASC) 10 MG tablet    Yes Aiden Jasmine MD   aspirin 81 MG EC tablet Take 1 tablet by mouth daily   Yes Aiden Jasmine MD   atorvastatin (LIPITOR) 80 MG tablet 1 tablet   Yes Aiden Jasmine MD   busPIRone (BUSPAR) 10 MG tablet Take 1 tablet by mouth 3 times daily

## 2025-06-22 NOTE — ED TRIAGE NOTES
Started yesterday  Sob, x 1 hr went away  0600 started back today  Sob, sweating has gotten better  No CP  No cold symptoms,   No wheezing noted bilat  Talking in complete sentences  Former smoking  No hx of COPD   [facility]

## 2025-06-23 ENCOUNTER — APPOINTMENT (OUTPATIENT)
Facility: HOSPITAL | Age: 60
End: 2025-06-23
Attending: INTERNAL MEDICINE
Payer: MEDICARE

## 2025-06-23 ENCOUNTER — HOSPITAL ENCOUNTER (INPATIENT)
Facility: HOSPITAL | Age: 60
Discharge: HOME | End: 2025-06-26
Payer: MEDICARE

## 2025-06-23 LAB
ANION GAP SERPL CALC-SCNC: 5 MMOL/L (ref 2–12)
BUN SERPL-MCNC: 22 MG/DL (ref 6–20)
BUN/CREAT SERPL: 18 (ref 12–20)
CA-I BLD-MCNC: 8.8 MG/DL (ref 8.5–10.1)
CHLORIDE SERPL-SCNC: 109 MMOL/L (ref 97–108)
CHOLEST SERPL-MCNC: 102 MG/DL
CO2 SERPL-SCNC: 27 MMOL/L (ref 21–32)
CREAT SERPL-MCNC: 1.2 MG/DL (ref 0.55–1.02)
EKG ATRIAL RATE: 85 BPM
EKG DIAGNOSIS: NORMAL
EKG P AXIS: 55 DEGREES
EKG P-R INTERVAL: 146 MS
EKG Q-T INTERVAL: 372 MS
EKG QRS DURATION: 96 MS
EKG QTC CALCULATION (BAZETT): 442 MS
EKG R AXIS: 23 DEGREES
EKG T AXIS: 111 DEGREES
EKG VENTRICULAR RATE: 85 BPM
EST. AVERAGE GLUCOSE BLD GHB EST-MCNC: 206 MG/DL
FERRITIN SERPL-MCNC: 54 NG/ML (ref 8–252)
GLUCOSE BLD STRIP.AUTO-MCNC: 109 MG/DL (ref 65–100)
GLUCOSE BLD STRIP.AUTO-MCNC: 208 MG/DL (ref 65–100)
GLUCOSE BLD STRIP.AUTO-MCNC: 216 MG/DL (ref 65–100)
GLUCOSE BLD STRIP.AUTO-MCNC: 335 MG/DL (ref 65–100)
GLUCOSE SERPL-MCNC: 186 MG/DL (ref 65–100)
HBA1C MFR BLD: 8.8 % (ref 4–5.6)
HDLC SERPL-MCNC: 35 MG/DL
HDLC SERPL: 2.9 (ref 0–5)
LDLC SERPL CALC-MCNC: 38.2 MG/DL (ref 0–100)
LIPID PANEL: NORMAL
MAGNESIUM SERPL-MCNC: 1.6 MG/DL (ref 1.6–2.4)
PERFORMED BY:: ABNORMAL
POTASSIUM SERPL-SCNC: 3.7 MMOL/L (ref 3.5–5.1)
SODIUM SERPL-SCNC: 141 MMOL/L (ref 136–145)
T4 FREE SERPL-MCNC: 1.1 NG/DL (ref 0.8–1.5)
TRIGL SERPL-MCNC: 144 MG/DL
VLDLC SERPL CALC-MCNC: 28.8 MG/DL

## 2025-06-23 PROCEDURE — 6360000004 HC RX CONTRAST MEDICATION

## 2025-06-23 PROCEDURE — 36415 COLL VENOUS BLD VENIPUNCTURE: CPT

## 2025-06-23 PROCEDURE — 80048 BASIC METABOLIC PNL TOTAL CA: CPT

## 2025-06-23 PROCEDURE — 6370000000 HC RX 637 (ALT 250 FOR IP): Performed by: STUDENT IN AN ORGANIZED HEALTH CARE EDUCATION/TRAINING PROGRAM

## 2025-06-23 PROCEDURE — 83735 ASSAY OF MAGNESIUM: CPT

## 2025-06-23 PROCEDURE — 71046 X-RAY EXAM CHEST 2 VIEWS: CPT

## 2025-06-23 PROCEDURE — 6370000000 HC RX 637 (ALT 250 FOR IP): Performed by: NURSE PRACTITIONER

## 2025-06-23 PROCEDURE — 80061 LIPID PANEL: CPT

## 2025-06-23 PROCEDURE — 2060000000 HC ICU INTERMEDIATE R&B

## 2025-06-23 PROCEDURE — 6360000002 HC RX W HCPCS: Performed by: INTERNAL MEDICINE

## 2025-06-23 PROCEDURE — 2580000003 HC RX 258: Performed by: INTERNAL MEDICINE

## 2025-06-23 PROCEDURE — C8929 TTE W OR WO FOL WCON,DOPPLER: HCPCS

## 2025-06-23 PROCEDURE — 6370000000 HC RX 637 (ALT 250 FOR IP): Performed by: INTERNAL MEDICINE

## 2025-06-23 PROCEDURE — 2500000003 HC RX 250 WO HCPCS: Performed by: INTERNAL MEDICINE

## 2025-06-23 PROCEDURE — 82962 GLUCOSE BLOOD TEST: CPT

## 2025-06-23 RX ORDER — MIDODRINE HYDROCHLORIDE 5 MG/1
10 TABLET ORAL ONCE
Status: COMPLETED | OUTPATIENT
Start: 2025-06-23 | End: 2025-06-23

## 2025-06-23 RX ORDER — 0.9 % SODIUM CHLORIDE 0.9 %
250 INTRAVENOUS SOLUTION INTRAVENOUS ONCE
Status: COMPLETED | OUTPATIENT
Start: 2025-06-23 | End: 2025-06-23

## 2025-06-23 RX ADMIN — ATORVASTATIN CALCIUM 80 MG: 40 TABLET, FILM COATED ORAL at 20:49

## 2025-06-23 RX ADMIN — CARVEDILOL 6.25 MG: 3.12 TABLET, FILM COATED ORAL at 08:30

## 2025-06-23 RX ADMIN — INSULIN LISPRO 6 UNITS: 100 INJECTION, SOLUTION INTRAVENOUS; SUBCUTANEOUS at 12:20

## 2025-06-23 RX ADMIN — ASPIRIN 81 MG: 81 TABLET, CHEWABLE ORAL at 08:31

## 2025-06-23 RX ADMIN — TIZANIDINE 10 MG: 2 TABLET ORAL at 20:49

## 2025-06-23 RX ADMIN — CLOPIDOGREL BISULFATE 75 MG: 75 TABLET, FILM COATED ORAL at 08:31

## 2025-06-23 RX ADMIN — ONDANSETRON 4 MG: 2 INJECTION, SOLUTION INTRAMUSCULAR; INTRAVENOUS at 21:25

## 2025-06-23 RX ADMIN — SODIUM CHLORIDE, PRESERVATIVE FREE 20 ML: 5 INJECTION INTRAVENOUS at 20:50

## 2025-06-23 RX ADMIN — AMLODIPINE BESYLATE 10 MG: 5 TABLET ORAL at 08:31

## 2025-06-23 RX ADMIN — MIDODRINE HYDROCHLORIDE 10 MG: 5 TABLET ORAL at 13:26

## 2025-06-23 RX ADMIN — CARVEDILOL 6.25 MG: 3.12 TABLET, FILM COATED ORAL at 20:50

## 2025-06-23 RX ADMIN — SODIUM CHLORIDE 250 ML: 0.9 INJECTION, SOLUTION INTRAVENOUS at 13:28

## 2025-06-23 RX ADMIN — ENOXAPARIN SODIUM 40 MG: 100 INJECTION SUBCUTANEOUS at 08:33

## 2025-06-23 RX ADMIN — BUSPIRONE HYDROCHLORIDE 10 MG: 10 TABLET ORAL at 08:31

## 2025-06-23 RX ADMIN — PANTOPRAZOLE SODIUM 40 MG: 40 TABLET, DELAYED RELEASE ORAL at 06:05

## 2025-06-23 RX ADMIN — TIZANIDINE 10 MG: 2 TABLET ORAL at 08:32

## 2025-06-23 RX ADMIN — BUSPIRONE HYDROCHLORIDE 10 MG: 10 TABLET ORAL at 13:26

## 2025-06-23 RX ADMIN — FUROSEMIDE 40 MG: 10 INJECTION, SOLUTION INTRAMUSCULAR; INTRAVENOUS at 08:30

## 2025-06-23 RX ADMIN — INSULIN GLARGINE 15 UNITS: 100 INJECTION, SOLUTION SUBCUTANEOUS at 08:30

## 2025-06-23 RX ADMIN — INSULIN LISPRO 2 UNITS: 100 INJECTION, SOLUTION INTRAVENOUS; SUBCUTANEOUS at 08:30

## 2025-06-23 RX ADMIN — BUSPIRONE HYDROCHLORIDE 10 MG: 10 TABLET ORAL at 20:49

## 2025-06-23 RX ADMIN — ZOLPIDEM TARTRATE 10 MG: 5 TABLET ORAL at 20:49

## 2025-06-23 RX ADMIN — SERTRALINE HYDROCHLORIDE 50 MG: 50 TABLET ORAL at 08:31

## 2025-06-23 RX ADMIN — SULFUR HEXAFLUORIDE 5 ML: 60.7; .19; .19 INJECTION, POWDER, LYOPHILIZED, FOR SUSPENSION INTRAVENOUS; INTRAVESICAL at 16:43

## 2025-06-23 RX ADMIN — Medication 5 MG: at 23:12

## 2025-06-23 RX ADMIN — DULOXETINE 90 MG: 30 CAPSULE, DELAYED RELEASE ORAL at 20:49

## 2025-06-23 RX ADMIN — INSULIN LISPRO 2 UNITS: 100 INJECTION, SOLUTION INTRAVENOUS; SUBCUTANEOUS at 20:51

## 2025-06-23 RX ADMIN — SODIUM CHLORIDE, PRESERVATIVE FREE 10 ML: 5 INJECTION INTRAVENOUS at 08:33

## 2025-06-23 RX ADMIN — ISOSORBIDE MONONITRATE 60 MG: 60 TABLET, EXTENDED RELEASE ORAL at 08:31

## 2025-06-23 RX ADMIN — LISINOPRIL 5 MG: 5 TABLET ORAL at 08:33

## 2025-06-23 NOTE — CONSULTS
Nutrition Education    Educated on Heart Failure/Heart Healthy diet; pt was open to learning and responsive; pt expressed that she tended to eat higher sodium foods, I suggested the pt try using Mrs. Tillman as a seasoning alternative for cooking at home and she was very open to the idea; educated pt on low sodium foods, how to grocery shop for heart healthy/low sodium foods (pt grocery shops for herself), and what a sample day of eating a low sodium diet might look like  Learners: Patient  Readiness: Acceptance  Method: Explanation and Handout  Response: Verbalizes Understanding  Contact name and number provided.    Emmanuelle Casper RD  Contact Number: [email or web address]     Azelaic Acid Counseling: Patient counseled that medicine may cause skin irritation and to avoid applying near the eyes.  In the event of skin irritation, the patient was advised to reduce the amount of the drug applied or use it less frequently.   The patient verbalized understanding of the proper use and possible adverse effects of azelaic acid.  All of the patient's questions and concerns were addressed.

## 2025-06-23 NOTE — CONSULTS
6/23/2025, 9:52 AM        [facility]  [address] [place] [id number]  Phone: [phone]      Cardiology Consult      6/23/2025, 9:52 AM      Eliz Villalba  59 y.o. female  1965      Admit Date:  6/22/2025    Primary Cardiologist:  Dr. Martínez  Reason for consult:  CHF  Requesting provider: Dr. Gutierrez    Impression:      The patient appears to have mixed picture of HFpEF and pneumonia with leukocytosis.  Suspect fluid overload related to salt intake and recent renal artery stenting procedure at [facility].  Elevated troponin which appears to be type II non-STEMI.  Status post three-vessel CABG, 2010 with recent PCI of LIMA to LAD, 3-2025.  PAD with history of bilateral iliac stenting.  History of CVA     Plan:      Agree with IV Lasix for diuresis with watch on electrolyte.  Continue DAPT with nitrates, beta-blockers and high-dose statin for empiric antianginal coverage.  Agree with continued use of amlodipine and ACE inhibitor for blood pressure control.    I will follow-up on echocardiogram ordered earlier.  IV antibiotics as primary care team for coexistent pneumonia.  Will follow patient with you.  Subjective     This is a 59-year-old female with a history of known coronary artery disease, status post three-vessel CABG 2010 at [facility], status post PTCA of LIMA to LAD, 3-2025,[facility], PAD with bilateral iliac stenting, type 2 diabetes mellitus, hypertension, hypercholesterolemia, fibromyalgia and status post CVA who recently on 6/18/2025 underwent angioplasty/stenting of left renal artery by Dr. Kwong at [facility].  Patient, couple of days later following this procedure began to experience increasing shortness of breath with orthopnea.  She denies any chest pain, palpitations, chest fluttering or any leg edema.  Patient in the ER was noted to be hypoxic with room air O2 sats of 88%.  Her troponin was 779 which later increased to 1385.  Patient's BNP was 7027 and chest x-ray was  ml     Patient Vitals for the past 120 hrs:   Weight   06/22/25 1604 61.7 kg (135 lb 15.7 oz)   06/23/25 0337 62.7 kg (138 lb 3.7 oz)           Admit weight: Weight - Scale: 61.7 kg (135 lb 15.7 oz)      Exam       Constitutional Generally well without symptoms, No weight loss, no fever, chills or nausea or vominting, and Well developed, well nourished in no apparent distress   HEENT normal atraumatic, no neck masses, normal thyroid   Cardiovascular RRR.  Normal S1/S2.  No M/R/G.both carotids normal upstroke without bruits, radial pulses normal, pedal pulses normal both DP's and PT's LE edema-none.  Left groin area shows bruised skin from recent catheterization.   Pulmonary rales R base, L apex   Abnominal soft, non-tender, without masses or organomegaly, normal bowel sounds, and no masses palpated   Genitourinary Deferred   Muskuloskeletal No obvious joint deformities.  No clubbing   Neuro Alert and oriented, Non focal neurologic exam   Psychiatric Demonstrates good judgement and insight into his or her medical condition   Extremities warm, well perfused   Skin Warm and dry         Labs     Lab Results   Component Value Date/Time    WBC 13.5 06/22/2025 10:35 AM    HGB 9.0 06/22/2025 10:35 AM    HCT 28.6 06/22/2025 10:35 AM     06/22/2025 10:35 AM    MCV 86.1 06/22/2025 10:35 AM     Lab Results   Component Value Date/Time     06/23/2025 05:58 AM    K 3.7 06/23/2025 05:58 AM     06/23/2025 05:58 AM    CO2 27 06/23/2025 05:58 AM    BUN 22 06/23/2025 05:58 AM      Last Lipid:  No results found for: \"CHOL\", \"CHLST\", \"CHOLV\", \"HDL\", \"HDLC\", \"LDL\", \"LDLC\"  No results found for: \"CPK\", \"CKMB\", \"BNP\"   Lab Results   Component Value Date/Time    TSH 2.90 06/22/2025 03:21 PM          Imaging & Acillary Studies     Last EKG    No results found for this or any previous visit (from the past 4464 hours).     CTA CHEST W WO CONTRAST   Final Result   No pulmonary embolism   Multilobar pneumonia or alveolitis or

## 2025-06-23 NOTE — PLAN OF CARE
Problem: Chronic Conditions and Co-morbidities  Goal: Patient's chronic conditions and co-morbidity symptoms are monitored and maintained or improved  6/23/2025 0149 by Louisa Jack, RN  Outcome: Progressing  Flowsheets (Taken 6/23/2025 0149)  Care Plan - Patient's Chronic Conditions and Co-Morbidity Symptoms are Monitored and Maintained or Improved:   Monitor and assess patient's chronic conditions and comorbid symptoms for stability, deterioration, or improvement   Collaborate with multidisciplinary team to address chronic and comorbid conditions and prevent exacerbation or deterioration   Update acute care plan with appropriate goals if chronic or comorbid symptoms are exacerbated and prevent overall improvement and discharge     Problem: Discharge Planning  Goal: Discharge to home or other facility with appropriate resources  6/23/2025 0149 by Louisa Jack, RN  Outcome: Progressing  Flowsheets (Taken 6/23/2025 0149)  Discharge to home or other facility with appropriate resources:   Identify barriers to discharge with patient and caregiver   Identify discharge learning needs (meds, wound care, etc)   Arrange for interpreters to assist at discharge as needed

## 2025-06-23 NOTE — PLAN OF CARE
Problem: Discharge Planning  Goal: Discharge to home or other facility with appropriate resources  6/23/2025 1945 by Louisa Jack, RN  Outcome: Progressing  Flowsheets (Taken 6/23/2025 1945)  Discharge to home or other facility with appropriate resources:   Identify barriers to discharge with patient and caregiver   Arrange for needed discharge resources and transportation as appropriate   Identify discharge learning needs (meds, wound care, etc)   Arrange for interpreters to assist at discharge as needed   Refer to discharge planning if patient needs post-hospital services based on physician order or complex needs related to functional status, cognitive ability or social support system     Problem: Chronic Conditions and Co-morbidities  Goal: Patient's chronic conditions and co-morbidity symptoms are monitored and maintained or improved  6/23/2025 1945 by Louisa Jack, RN  Outcome: Progressing  Flowsheets (Taken 6/23/2025 1945)  Care Plan - Patient's Chronic Conditions and Co-Morbidity Symptoms are Monitored and Maintained or Improved:   Monitor and assess patient's chronic conditions and comorbid symptoms for stability, deterioration, or improvement   Collaborate with multidisciplinary team to address chronic and comorbid conditions and prevent exacerbation or deterioration   Update acute care plan with appropriate goals if chronic or comorbid symptoms are exacerbated and prevent overall improvement and discharge

## 2025-06-23 NOTE — CARE COORDINATION
06/23/25 1616   Service Assessment   Patient Orientation Alert and Oriented   Cognition Alert   History Provided By Medical Record  (off floor)   Primary Caregiver Self   Accompanied By/Relationship husbnad   Support Systems Spouse/Significant Other   Patient's Healthcare Decision Maker is: Legal Next of Kin   PCP Verified by CM Yes   Prior Functional Level Independent in ADLs/IADLs   Current Functional Level Independent in ADLs/IADLs   Family able to assist with home care needs: Yes   Would you like for me to discuss the discharge plan with any other family members/significant others, and if so, who? Yes  ()   Discharge Planning   History of falls? 0     Lives with .     Advance Care Planning     General Advance Care Planning (ACP) Conversation    Date of Conversation: 6/23/2025  Conducted with: Patient with Decision Making Capacity  Other persons present: Spouse      Healthcare Decision Maker: No healthcare decision makers have been documented.       Content/Action Overview:  Has ACP document(s) NOT on file - requested patient to provide  Reviewed DNR/DNI and patient elects Full Code (Attempt Resuscitation)        Length of Voluntary ACP Conversation in minutes:  <16 minutes (Non-Billable)    Che Caba RN

## 2025-06-24 VITALS
WEIGHT: 137.35 LBS | HEART RATE: 84 BPM | BODY MASS INDEX: 24.34 KG/M2 | OXYGEN SATURATION: 98 % | RESPIRATION RATE: 17 BRPM | HEIGHT: 63 IN | SYSTOLIC BLOOD PRESSURE: 105 MMHG | TEMPERATURE: 99.3 F | DIASTOLIC BLOOD PRESSURE: 65 MMHG

## 2025-06-24 LAB
ANION GAP SERPL CALC-SCNC: 6 MMOL/L (ref 2–12)
BUN SERPL-MCNC: 28 MG/DL (ref 6–20)
BUN/CREAT SERPL: 18 (ref 12–20)
CA-I BLD-MCNC: 8.3 MG/DL (ref 8.5–10.1)
CHLORIDE SERPL-SCNC: 106 MMOL/L (ref 97–108)
CO2 SERPL-SCNC: 24 MMOL/L (ref 21–32)
CREAT SERPL-MCNC: 1.55 MG/DL (ref 0.55–1.02)
ECHO AO ASC DIAM: 2.8 CM
ECHO AO ASCENDING AORTA INDEX: 1.7 CM/M2
ECHO AO ROOT DIAM: 2.8 CM
ECHO AO ROOT INDEX: 1.7 CM/M2
ECHO AV AREA PEAK VELOCITY: 1.1 CM2
ECHO AV AREA VTI: 1.2 CM2
ECHO AV AREA/BSA PEAK VELOCITY: 0.7 CM2/M2
ECHO AV AREA/BSA VTI: 0.7 CM2/M2
ECHO AV MEAN GRADIENT: 7 MMHG
ECHO AV MEAN VELOCITY: 1.2 M/S
ECHO AV PEAK GRADIENT: 12 MMHG
ECHO AV PEAK VELOCITY: 1.7 M/S
ECHO AV VELOCITY RATIO: 0.35
ECHO AV VTI: 34.8 CM
ECHO BSA: 1.66 M2
ECHO EST RA PRESSURE: 3 MMHG
ECHO IVC EXP: 1.8 CM
ECHO LA AREA 4C: 21.7 CM2
ECHO LA DIAMETER INDEX: 2.55 CM/M2
ECHO LA DIAMETER: 4.2 CM
ECHO LA MAJOR AXIS: 6.8 CM
ECHO LA TO AORTIC ROOT RATIO: 1.5
ECHO LA VOL MOD A4C: 57 ML (ref 22–52)
ECHO LA VOLUME INDEX MOD A4C: 35 ML/M2 (ref 16–34)
ECHO LV E' LATERAL VELOCITY: 10.1 CM/S
ECHO LV E' SEPTAL VELOCITY: 8.49 CM/S
ECHO LV EDV A2C: 182 ML
ECHO LV EDV A4C: 165 ML
ECHO LV EDV INDEX A4C: 100 ML/M2
ECHO LV EDV NDEX A2C: 110 ML/M2
ECHO LV EF PHYSICIAN: 42 %
ECHO LV EJECTION FRACTION A2C: 39 %
ECHO LV EJECTION FRACTION A4C: 46 %
ECHO LV EJECTION FRACTION BIPLANE: 45 % (ref 55–100)
ECHO LV ESV A2C: 111 ML
ECHO LV ESV A4C: 89 ML
ECHO LV ESV INDEX A2C: 67 ML/M2
ECHO LV ESV INDEX A4C: 54 ML/M2
ECHO LV FRACTIONAL SHORTENING: 20 % (ref 28–44)
ECHO LV INTERNAL DIMENSION DIASTOLE INDEX: 3.58 CM/M2
ECHO LV INTERNAL DIMENSION DIASTOLIC: 5.9 CM (ref 3.9–5.3)
ECHO LV INTERNAL DIMENSION SYSTOLIC INDEX: 2.85 CM/M2
ECHO LV INTERNAL DIMENSION SYSTOLIC: 4.7 CM
ECHO LV IVSD: 0.7 CM (ref 0.6–0.9)
ECHO LV MASS 2D: 180.7 G (ref 67–162)
ECHO LV MASS INDEX 2D: 109.5 G/M2 (ref 43–95)
ECHO LV POSTERIOR WALL DIASTOLIC: 0.9 CM (ref 0.6–0.9)
ECHO LV RELATIVE WALL THICKNESS RATIO: 0.31
ECHO LVOT AREA: 3.1 CM2
ECHO LVOT AV VTI INDEX: 0.37
ECHO LVOT DIAM: 2 CM
ECHO LVOT MEAN GRADIENT: 1 MMHG
ECHO LVOT PEAK GRADIENT: 2 MMHG
ECHO LVOT PEAK VELOCITY: 0.6 M/S
ECHO LVOT STROKE VOLUME INDEX: 24.5 ML/M2
ECHO LVOT SV: 40.5 ML
ECHO LVOT VTI: 12.9 CM
ECHO MV A VELOCITY: 0.96 M/S
ECHO MV AREA VTI: 0.9 CM2
ECHO MV E DECELERATION TIME (DT): 265 MS
ECHO MV E VELOCITY: 1.55 M/S
ECHO MV E/A RATIO: 1.61
ECHO MV E/E' LATERAL: 15.35
ECHO MV E/E' RATIO (AVERAGED): 16.8
ECHO MV E/E' SEPTAL: 18.26
ECHO MV LVOT VTI INDEX: 3.47
ECHO MV MAX VELOCITY: 1.7 M/S
ECHO MV MEAN GRADIENT: 6 MMHG
ECHO MV MEAN VELOCITY: 1.1 M/S
ECHO MV PEAK GRADIENT: 12 MMHG
ECHO MV REGURGITANT ALIASING (NYQUIST) VELOCITY: 92 CM/S
ECHO MV REGURGITANT PEAK GRADIENT: 67 MMHG
ECHO MV REGURGITANT PEAK VELOCITY: 4.1 M/S
ECHO MV REGURGITANT RADIUS PISA: 0.5 CM
ECHO MV VTI: 44.8 CM
ECHO PULMONARY ARTERY END DIASTOLIC PRESSURE: 9 MMHG
ECHO PV ACCELERATION TIME (AT): 101 MS
ECHO PV MAX VELOCITY: 1.2 M/S
ECHO PV PEAK GRADIENT: 5 MMHG
ECHO PV REGURGITANT MAX VELOCITY: 1.5 M/S
ECHO RA AREA 4C: 15.6 CM2
ECHO RA END SYSTOLIC VOLUME APICAL 4 CHAMBER INDEX BSA: 24 ML/M2
ECHO RA VOLUME: 40 ML
ECHO RIGHT VENTRICULAR SYSTOLIC PRESSURE (RVSP): 15 MMHG
ECHO RV BASAL DIMENSION: 4 CM
ECHO RV FREE WALL PEAK S': 9.5 CM/S
ECHO RV TAPSE: 1.9 CM (ref 1.7–?)
ECHO TV REGURGITANT MAX VELOCITY: 1.74 M/S
ECHO TV REGURGITANT PEAK GRADIENT: 12 MMHG
GLUCOSE BLD STRIP.AUTO-MCNC: 182 MG/DL (ref 65–100)
GLUCOSE BLD STRIP.AUTO-MCNC: 205 MG/DL (ref 65–100)
GLUCOSE SERPL-MCNC: 221 MG/DL (ref 65–100)
MAGNESIUM SERPL-MCNC: 1.5 MG/DL (ref 1.6–2.4)
PERFORMED BY:: ABNORMAL
PERFORMED BY:: ABNORMAL
POTASSIUM SERPL-SCNC: 4 MMOL/L (ref 3.5–5.1)
SODIUM SERPL-SCNC: 136 MMOL/L (ref 136–145)

## 2025-06-24 PROCEDURE — 6370000000 HC RX 637 (ALT 250 FOR IP): Performed by: INTERNAL MEDICINE

## 2025-06-24 PROCEDURE — 82962 GLUCOSE BLOOD TEST: CPT

## 2025-06-24 PROCEDURE — 83735 ASSAY OF MAGNESIUM: CPT

## 2025-06-24 PROCEDURE — 6360000002 HC RX W HCPCS: Performed by: INTERNAL MEDICINE

## 2025-06-24 PROCEDURE — 2500000003 HC RX 250 WO HCPCS: Performed by: INTERNAL MEDICINE

## 2025-06-24 PROCEDURE — 36415 COLL VENOUS BLD VENIPUNCTURE: CPT

## 2025-06-24 PROCEDURE — 80048 BASIC METABOLIC PNL TOTAL CA: CPT

## 2025-06-24 RX ORDER — FUROSEMIDE 20 MG/1
20 TABLET ORAL DAILY
Qty: 60 TABLET | Refills: 3 | Status: ON HOLD | OUTPATIENT
Start: 2025-07-01 | End: 2025-06-28

## 2025-06-24 RX ADMIN — PANTOPRAZOLE SODIUM 40 MG: 40 TABLET, DELAYED RELEASE ORAL at 06:05

## 2025-06-24 RX ADMIN — FUROSEMIDE 40 MG: 10 INJECTION, SOLUTION INTRAMUSCULAR; INTRAVENOUS at 09:07

## 2025-06-24 RX ADMIN — INSULIN LISPRO 2 UNITS: 100 INJECTION, SOLUTION INTRAVENOUS; SUBCUTANEOUS at 12:07

## 2025-06-24 RX ADMIN — CLOPIDOGREL BISULFATE 75 MG: 75 TABLET, FILM COATED ORAL at 09:14

## 2025-06-24 RX ADMIN — SODIUM CHLORIDE, PRESERVATIVE FREE 10 ML: 5 INJECTION INTRAVENOUS at 09:20

## 2025-06-24 RX ADMIN — CARVEDILOL 6.25 MG: 3.12 TABLET, FILM COATED ORAL at 09:14

## 2025-06-24 RX ADMIN — ISOSORBIDE MONONITRATE 60 MG: 60 TABLET, EXTENDED RELEASE ORAL at 09:14

## 2025-06-24 RX ADMIN — INSULIN LISPRO 2 UNITS: 100 INJECTION, SOLUTION INTRAVENOUS; SUBCUTANEOUS at 09:18

## 2025-06-24 RX ADMIN — ASPIRIN 81 MG: 81 TABLET, CHEWABLE ORAL at 09:14

## 2025-06-24 RX ADMIN — ENOXAPARIN SODIUM 40 MG: 100 INJECTION SUBCUTANEOUS at 09:19

## 2025-06-24 RX ADMIN — INSULIN GLARGINE 15 UNITS: 100 INJECTION, SOLUTION SUBCUTANEOUS at 09:17

## 2025-06-24 RX ADMIN — BUSPIRONE HYDROCHLORIDE 10 MG: 10 TABLET ORAL at 09:14

## 2025-06-24 RX ADMIN — AMLODIPINE BESYLATE 10 MG: 5 TABLET ORAL at 09:14

## 2025-06-24 NOTE — DISCHARGE SUMMARY
Physician Discharge Summary     Patient ID:    Eliz Villalba  [id number]  59 y.o.  1965    Admit date: 6/22/2025    Discharge date : 6/24/2025      Final Diagnoses:   Pleural effusion [J90]  NSTEMI (non-ST elevated myocardial infarction) ([facility]) [I21.4]  Acute respiratory failure with hypoxia ([facility]) [J96.01]  Pneumonia of both lungs due to infectious organism, unspecified part of lung [J18.9]  AK I  heart failure reduce ejection fraction  EF 40 to 45%  systolic heart failure  diastolic heart failure  congestive heart failure  acute heart failure  type II non-STEMI  CAD history of CABG  history of PCI  resistant hypertension  renal artery stenosis with recent left renal artery angioplasty at [facility] 16/18  peripheral artery disease with history of bilateral iliac stenting  diabetes mellitus  history of stroke X2  anemia of chronic disease    Reason for Hospitalization:    Eliz Villalba is a 59 y.o. female with multiple medical problems including CAD, diabetes, fibromyalgia, hyperlipidemia, hypertension and stroke who presented to the [facility] ED this morning with complaints of shortness of breath and  fluid retention over the past 2 days.  She denies leg edema, however.  No chest pain.  Patient is a prior smoker, no diagnosis of COPD.     In the ED, patient had stable vital signs, was not hypoxic initially.  However when they got her up and ambulated her saturations dropped to 88%.  Her troponin was 779, up trended to 943.  proBNP is 7000.  CTA of the chest was negative for PE but did show multilobar alveolar infiltrates and small pleural effusions.  EKG showed sinus rhythm with isolated T wave inversion in aVL    Hospital Course:     Patient was admitted for acute heart failure, pneumonia was ruled out. Pro BNP was seven-game. Previous echo in May 2024 revealed EF of 50 to 55%. Echo 6/23 revealed EF of 40 to 45%. Continued IV Lasix inpatient and discharged on oral Lasix.  Patient had JANNA score  input(s): \"PH\", \"PCO2\", \"PO2\" in the last 72 hours.  No results for input(s): \"CKTOTAL\", \"CKMB\", \"TROPONINI\" in the last 72 hours.  Lab Results   Component Value Date/Time    POCGLU 205 06/24/2025 11:57 AM    POCGLU 182 06/24/2025 08:40 AM    POCGLU 216 06/23/2025 08:14 PM    POCGLU 109 06/23/2025 05:08 PM    POCGLU 335 06/23/2025 11:39 AM         Discharge time spent 35 minutes    Signed:  Robert Merchant MD  6/24/2025  1:54 PM

## 2025-06-24 NOTE — PROGRESS NOTES
Hospitalist Progress Note               Daily Progress Note: 6/23/2025      Hospital Day: 2     Chief complaint:   Chief Complaint   Patient presents with    Shortness of Breath        Subjective:   Hospital course to date:    Eliz Villalba is a 59 y.o. female with multiple medical problems including CAD, diabetes, fibromyalgia, hyperlipidemia, hypertension and stroke who presented to the [facility] ED this morning with complaints of shortness of breath and  fluid retention over the past 2 days.  She denies leg edema, however.  No chest pain.  Patient is a prior smoker, no diagnosis of COPD.     In the ED, patient had stable vital signs, was not hypoxic initially.  However when they got her up and ambulated her saturations dropped to 88%.  Her troponin was 779, up trended to 943.  proBNP is 7000.  CTA of the chest was negative for PE but did show multilobar alveolar infiltrates and small pleural effusions.  EKG showed sinus rhythm with isolated T wave inversion in aVL  --------  Patient is seen today for follow-up.  She is feeling much better today.  Breathing comfortably on room air.        Medications reviewed  Current Facility-Administered Medications   Medication Dose Route Frequency    azithromycin (ZITHROMAX) 500 mg in sodium chloride 0.9 % 250 mL IVPB ([id number])  500 mg IntraVENous Daily    aspirin chewable tablet 81 mg  81 mg Oral Daily    sodium chloride flush 0.9 % injection 5-40 mL  5-40 mL IntraVENous 2 times per day    sodium chloride flush 0.9 % injection 5-40 mL  5-40 mL IntraVENous PRN    0.9 % sodium chloride infusion   IntraVENous PRN    aluminum & magnesium hydroxide-simethicone (MAALOX PLUS) 200-200-20 MG/5ML suspension 30 mL  30 mL Oral Q6H PRN    ondansetron (ZOFRAN-ODT) disintegrating tablet 4 mg  4 mg Oral Q8H PRN    Or    ondansetron (ZOFRAN) injection 4 mg  4 mg IntraVENous Q6H PRN    polyethylene glycol (GLYCOLAX) packet 17 g  17 g Oral Daily PRN    acetaminophen (TYLENOL) tablet 650 
  Physician Progress Note      PATIENT:               PUSHPA MCADAMS  CSN #:                  [id number]  :                       1965  ADMIT DATE:       2025 10:15 AM  DISCH DATE:  RESPONDING  PROVIDER #:        Contreras Gutierrez MD          QUERY TEXT:    Acute respiratory failure is documented in the medical record (H&P). Please   provide additional clinical indicators supportive of your documentation. Or   please document if the diagnosis of acute respiratory failure has been ruled   out after study.    The clinical indicators include:  -- ED Dr Nicole: No apparent respiratory distress.  Lung sounds clear   bilaterally.  No wheezing.  -- H&P Dr Gutierrez:  when they got her up and ambulated her saturations   dropped to 88%. PE: no distress. Crackles bilaterally.  Currently on 2 L nasal   cannula  -- Dr Gutierrez: Currently on room air  Options provided:  -- Acute Respiratory Failure ruled out after study  -- Acute Respiratory Failure as evidenced by, Please document evidence.  -- Other - I will add my own diagnosis  -- Disagree - Not applicable / Not valid  -- Disagree - Clinically unable to determine / Unknown  -- Refer to Clinical Documentation Reviewer    PROVIDER RESPONSE TEXT:    This patient is in acute respiratory failure as evidenced by    Query created by: EILEEN HARDY on 2025 12:39 PM      Electronically signed by:  Contreras Gutierrez MD 2025 1:28 PM          
4 Eyes Skin Assessment     NAME:  Eliz Villalba  YOB: 1965  MEDICAL RECORD NUMBER:  [id number]    The patient is being assessed for  Admission    I agree that at least one RN has performed a thorough Head to Toe Skin Assessment on the patient. ALL assessment sites listed below have been assessed.      Areas assessed by both nurses:    Head, Face, Ears, Shoulders, Back, Chest, Arms, Elbows, Hands, Sacrum. Buttock, Coccyx, Ischium, Legs. Feet and Heels, and Under Medical Devices has ecchymosis on the abdomen and left groin post angiogram.        Does the Patient have a Wound? No noted wound(s)       Ricco Prevention initiated by RN: Yes  Wound Care Orders initiated by RN: No    Pressure Injury (Stage 3,4, Unstageable, DTI, NWPT, and Complex wounds) if present, place Wound referral order by RN under : No    New Ostomies, if present place, Ostomy referral order under : No     Nurse 1 eSignature: Electronically signed by Fiorella May RN on 6/22/25 at 4:02 PM EDT    **SHARE this note so that the co-signing nurse can place an eSignature**    Nurse 2 eSignature: {Esignature:[id number]}   
Discharge plan of care/case management plan validated with provider discharge order.   
Patient has a discharge order.     Transition of Care Plan:    RUR: 15%  Prior Level of Functioning: independent  Disposition: home  GABBI: today  If SNF or IPR: Date FOC offered: n/a  Date FOC received: n/a  Accepting facility: n/a  Date authorization started with reference number: n/a  Date authorization received and expires: n/a  Follow up appointments: yes  DME needed: n/a  Transportation at discharge: family  IM/IMM Medicare/ letter given: yes  Is patient a [place] and connected with [facility]? N/a   If yes, was [place] transfer form completed and [facility] notified? N/a  Caregiver Contact: n/a  Discharge Caregiver contacted prior to discharge? N/a  Care Conference needed? N/a  Barriers to discharge: n/a    
Received Order for Telemetry     Eliz Villalba   1965   [id number]   Pleural effusion [J90]  NSTEMI (non-ST elevated myocardial infarction) ([facility]) [I21.4]  Acute respiratory failure with hypoxia ([facility]) [J96.01]  Pneumonia of both lungs due to infectious organism, unspecified part of lung [J18.9]   Contreras Gutierrez MD     Tele Box # 20 placed on patient at 1600 pm  ER Room # FS  Admitting to Room 473  Transferring Nurse GONZALO  Verified with Primary Nurse GONZALO at 1600 pm   
Personally Reviewed:    Recent Labs     06/22/25  1035   WBC 13.5*   HGB 9.0*   HCT 28.6*        No results for input(s): \"INR\", \"APTT\" in the last 72 hours.    Invalid input(s): \"PTP\"   Recent Labs     06/22/25  1035 06/23/25  0558 06/24/25  0242    141 136   K 4.5 3.7 4.0    109* 106   CO2 24 27 24   BUN 25* 22* 28*   MG 1.5* 1.6 1.5*     No results for input(s): \"CPK\" in the last 72 hours.    Invalid input(s): \"CPKMB\", \"CKNDX\", \"TROIQ\"  Lab Results   Component Value Date/Time    CHOL 102 06/23/2025 05:58 AM    HDL 35 06/23/2025 05:58 AM    LDL 38.2 06/23/2025 05:58 AM       Recent Labs     06/22/25  1035   GLOB 3.8     No results for input(s): \"PH\", \"PCO2\", \"PO2\" in the last 72 hours.    Medications Personally Reviewed:    Current Facility-Administered Medications   Medication Dose Route Frequency    melatonin tablet 5 mg  5 mg Oral Nightly PRN    aspirin chewable tablet 81 mg  81 mg Oral Daily    sodium chloride flush 0.9 % injection 5-40 mL  5-40 mL IntraVENous 2 times per day    sodium chloride flush 0.9 % injection 5-40 mL  5-40 mL IntraVENous PRN    0.9 % sodium chloride infusion   IntraVENous PRN    aluminum & magnesium hydroxide-simethicone (MAALOX PLUS) 200-200-20 MG/5ML suspension 30 mL  30 mL Oral Q6H PRN    ondansetron (ZOFRAN-ODT) disintegrating tablet 4 mg  4 mg Oral Q8H PRN    Or    ondansetron (ZOFRAN) injection 4 mg  4 mg IntraVENous Q6H PRN    polyethylene glycol (GLYCOLAX) packet 17 g  17 g Oral Daily PRN    acetaminophen (TYLENOL) tablet 650 mg  650 mg Oral Q6H PRN    Or    acetaminophen (TYLENOL) suppository 650 mg  650 mg Rectal Q6H PRN    enoxaparin (LOVENOX) injection 40 mg  40 mg SubCUTAneous Daily    potassium chloride (KLOR-CON M) extended release tablet 40 mEq  40 mEq Oral PRN    Or    potassium bicarb-citric acid (EFFER-K) effervescent tablet 40 mEq  40 mEq Oral PRN    Or    potassium chloride 10 mEq/100 mL IVPB (Peripheral Line)  10 mEq IntraVENous PRN    magnesium

## 2025-06-26 ENCOUNTER — HOSPITAL ENCOUNTER (INPATIENT)
Facility: HOSPITAL | Age: 60
Discharge: HOME | End: 2025-06-28
Attending: INTERNAL MEDICINE
Payer: MEDICARE

## 2025-06-26 ENCOUNTER — APPOINTMENT (OUTPATIENT)
Facility: HOSPITAL | Age: 60
End: 2025-06-26
Payer: MEDICARE

## 2025-06-26 ENCOUNTER — HOSPITAL ENCOUNTER (INPATIENT)
Facility: HOSPITAL | Age: 60
LOS: 2 days | Discharge: HOME | End: 2025-06-28
Admitting: INTERNAL MEDICINE
Payer: MEDICARE

## 2025-06-26 DIAGNOSIS — J96.01 ACUTE RESPIRATORY FAILURE WITH HYPOXIA (HCC): ICD-10-CM

## 2025-06-26 DIAGNOSIS — I21.4 NSTEMI (NON-ST ELEVATED MYOCARDIAL INFARCTION) (HCC): Primary | ICD-10-CM

## 2025-06-26 DIAGNOSIS — I50.9 ACUTE ON CHRONIC CONGESTIVE HEART FAILURE, UNSPECIFIED HEART FAILURE TYPE (HCC): ICD-10-CM

## 2025-06-26 PROBLEM — J96.00 ACUTE RESPIRATORY FAILURE (HCC): Status: ACTIVE | Noted: 2025-06-26

## 2025-06-26 LAB
ALBUMIN SERPL-MCNC: 3.2 G/DL (ref 3.5–5)
ALBUMIN/GLOB SERPL: 0.9 (ref 1.1–2.2)
ALP SERPL-CCNC: 121 U/L (ref 45–117)
ALT SERPL-CCNC: 19 U/L (ref 12–78)
ANION GAP SERPL CALC-SCNC: 9 MMOL/L (ref 2–12)
APPEARANCE UR: CLEAR
APTT PPP: 26.6 SEC (ref 21.2–34.1)
AST SERPL W P-5'-P-CCNC: 32 U/L (ref 15–37)
BACTERIA URNS QL MICRO: NEGATIVE /HPF
BASOPHILS # BLD: 0.09 K/UL (ref 0–0.1)
BASOPHILS NFR BLD: 0.5 % (ref 0–1)
BILIRUB SERPL-MCNC: 0.4 MG/DL (ref 0.2–1)
BILIRUB UR QL: NEGATIVE
BNP SERPL-MCNC: ABNORMAL PG/ML
BUN SERPL-MCNC: 27 MG/DL (ref 6–20)
BUN/CREAT SERPL: 19 (ref 12–20)
CA-I BLD-MCNC: 8.8 MG/DL (ref 8.5–10.1)
CHLORIDE SERPL-SCNC: 106 MMOL/L (ref 97–108)
CO2 SERPL-SCNC: 21 MMOL/L (ref 21–32)
COLOR UR: NORMAL
CREAT SERPL-MCNC: 1.42 MG/DL (ref 0.55–1.02)
DIFFERENTIAL METHOD BLD: ABNORMAL
ECHO AO ASC DIAM: 3.2 CM
ECHO AO ASCENDING AORTA INDEX: 1.96 CM/M2
ECHO AO ROOT DIAM: 2.8 CM
ECHO AO ROOT INDEX: 1.72 CM/M2
ECHO AV AREA PEAK VELOCITY: 1.5 CM2
ECHO AV AREA VTI: 1.6 CM2
ECHO AV AREA/BSA PEAK VELOCITY: 0.9 CM2/M2
ECHO AV AREA/BSA VTI: 1 CM2/M2
ECHO AV MEAN GRADIENT: 6 MMHG
ECHO AV MEAN VELOCITY: 1.1 M/S
ECHO AV PEAK GRADIENT: 9 MMHG
ECHO AV PEAK VELOCITY: 1.5 M/S
ECHO AV VELOCITY RATIO: 0.6
ECHO AV VTI: 30.2 CM
ECHO BSA: 1.65 M2
ECHO EST RA PRESSURE: 3 MMHG
ECHO LA AREA 4C: 23.2 CM2
ECHO LA DIAMETER INDEX: 2.76 CM/M2
ECHO LA DIAMETER: 4.5 CM
ECHO LA MAJOR AXIS: 6.4 CM
ECHO LA TO AORTIC ROOT RATIO: 1.61
ECHO LA VOL MOD A4C: 72 ML (ref 22–52)
ECHO LA VOLUME INDEX MOD A4C: 44 ML/M2 (ref 16–34)
ECHO LV E' LATERAL VELOCITY: 8.82 CM/S
ECHO LV E' SEPTAL VELOCITY: 5.8 CM/S
ECHO LV EDV A2C: 171 ML
ECHO LV EDV A4C: 155 ML
ECHO LV EDV INDEX A4C: 95 ML/M2
ECHO LV EDV NDEX A2C: 105 ML/M2
ECHO LV EF PHYSICIAN: 35 %
ECHO LV EJECTION FRACTION A2C: 46 %
ECHO LV EJECTION FRACTION A4C: 32 %
ECHO LV EJECTION FRACTION BIPLANE: 41 % (ref 55–100)
ECHO LV ESV A2C: 93 ML
ECHO LV ESV A4C: 105 ML
ECHO LV ESV INDEX A2C: 57 ML/M2
ECHO LV ESV INDEX A4C: 64 ML/M2
ECHO LV FRACTIONAL SHORTENING: 26 % (ref 28–44)
ECHO LV INTERNAL DIMENSION DIASTOLE INDEX: 3.07 CM/M2
ECHO LV INTERNAL DIMENSION DIASTOLIC: 5 CM (ref 3.9–5.3)
ECHO LV INTERNAL DIMENSION SYSTOLIC INDEX: 2.27 CM/M2
ECHO LV INTERNAL DIMENSION SYSTOLIC: 3.7 CM
ECHO LV IVSD: 1 CM (ref 0.6–0.9)
ECHO LV MASS 2D: 182 G (ref 67–162)
ECHO LV MASS INDEX 2D: 111.6 G/M2 (ref 43–95)
ECHO LV POSTERIOR WALL DIASTOLIC: 1 CM (ref 0.6–0.9)
ECHO LV RELATIVE WALL THICKNESS RATIO: 0.4
ECHO LVOT AREA: 2.5 CM2
ECHO LVOT AV VTI INDEX: 0.64
ECHO LVOT DIAM: 1.8 CM
ECHO LVOT MEAN GRADIENT: 2 MMHG
ECHO LVOT PEAK GRADIENT: 3 MMHG
ECHO LVOT PEAK VELOCITY: 0.9 M/S
ECHO LVOT STROKE VOLUME INDEX: 30.3 ML/M2
ECHO LVOT SV: 49.3 ML
ECHO LVOT VTI: 19.4 CM
ECHO MV A VELOCITY: 1.06 M/S
ECHO MV AREA VTI: 1.1 CM2
ECHO MV E DECELERATION TIME (DT): 224 MS
ECHO MV E VELOCITY: 1.45 M/S
ECHO MV E/A RATIO: 1.37
ECHO MV E/E' LATERAL: 16.44
ECHO MV E/E' RATIO (AVERAGED): 20.72
ECHO MV E/E' SEPTAL: 25
ECHO MV LVOT VTI INDEX: 2.31
ECHO MV MAX VELOCITY: 1.9 M/S
ECHO MV MEAN GRADIENT: 8 MMHG
ECHO MV MEAN VELOCITY: 1.4 M/S
ECHO MV PEAK GRADIENT: 14 MMHG
ECHO MV REGURGITANT PEAK GRADIENT: 92 MMHG
ECHO MV REGURGITANT PEAK VELOCITY: 4.8 M/S
ECHO MV VTI: 44.8 CM
ECHO RA AREA 4C: 13.7 CM2
ECHO RA END SYSTOLIC VOLUME APICAL 4 CHAMBER INDEX BSA: 21 ML/M2
ECHO RA VOLUME: 34 ML
ECHO RV BASAL DIMENSION: 3.6 CM
ECHO RV FREE WALL PEAK S': 9.1 CM/S
ECHO RV TAPSE: 1.5 CM (ref 1.7–?)
EOSINOPHIL # BLD: 0.2 K/UL (ref 0–0.4)
EOSINOPHIL NFR BLD: 1 % (ref 0–7)
EPITH CASTS URNS QL MICRO: NORMAL /LPF
ERYTHROCYTE [DISTWIDTH] IN BLOOD BY AUTOMATED COUNT: 13.1 % (ref 11.5–14.5)
ERYTHROCYTE [DISTWIDTH] IN BLOOD BY AUTOMATED COUNT: 13.2 % (ref 11.5–14.5)
FLUAV RNA SPEC QL NAA+PROBE: NOT DETECTED
FLUBV RNA SPEC QL NAA+PROBE: NOT DETECTED
GLOBULIN SER CALC-MCNC: 3.6 G/DL (ref 2–4)
GLUCOSE BLD STRIP.AUTO-MCNC: 174 MG/DL (ref 65–100)
GLUCOSE BLD STRIP.AUTO-MCNC: 262 MG/DL (ref 65–100)
GLUCOSE BLD STRIP.AUTO-MCNC: 316 MG/DL (ref 65–100)
GLUCOSE SERPL-MCNC: 316 MG/DL (ref 65–100)
GLUCOSE UR STRIP.AUTO-MCNC: NEGATIVE MG/DL
HCT VFR BLD AUTO: 26.3 % (ref 35–47)
HCT VFR BLD AUTO: 30.7 % (ref 35–47)
HGB BLD-MCNC: 8.6 G/DL (ref 11.5–16)
HGB BLD-MCNC: 9.5 G/DL (ref 11.5–16)
HGB UR QL STRIP: NEGATIVE
IMM GRANULOCYTES # BLD AUTO: 0.29 K/UL (ref 0–0.04)
IMM GRANULOCYTES NFR BLD AUTO: 1.5 % (ref 0–0.5)
INR PPP: 1.2 (ref 0.9–1.1)
KETONES UR QL STRIP.AUTO: NEGATIVE MG/DL
LACTATE BLD-SCNC: 1.3 MMOL/L (ref 0.4–2)
LACTATE SERPL-SCNC: 1.1 MMOL/L (ref 0.4–2)
LACTATE SERPL-SCNC: 1.2 MMOL/L (ref 0.4–2)
LEUKOCYTE ESTERASE UR QL STRIP.AUTO: NEGATIVE
LYMPHOCYTES # BLD: 1.03 K/UL (ref 0.8–3.5)
LYMPHOCYTES NFR BLD: 5.3 % (ref 12–49)
MCH RBC QN AUTO: 26.7 PG (ref 26–34)
MCH RBC QN AUTO: 27 PG (ref 26–34)
MCHC RBC AUTO-ENTMCNC: 30.9 G/DL (ref 30–36.5)
MCHC RBC AUTO-ENTMCNC: 32.7 G/DL (ref 30–36.5)
MCV RBC AUTO: 82.4 FL (ref 80–99)
MCV RBC AUTO: 86.2 FL (ref 80–99)
MONOCYTES # BLD: 1.06 K/UL (ref 0–1)
MONOCYTES NFR BLD: 5.5 % (ref 5–13)
NEUTS SEG # BLD: 16.65 K/UL (ref 1.8–8)
NEUTS SEG NFR BLD: 86.2 % (ref 32–75)
NITRITE UR QL STRIP.AUTO: NEGATIVE
NRBC # BLD: 0 K/UL (ref 0–0.01)
NRBC # BLD: 0 K/UL (ref 0–0.01)
NRBC BLD-RTO: 0 PER 100 WBC
NRBC BLD-RTO: 0 PER 100 WBC
PERFORMED BY:: ABNORMAL
PERFORMED BY:: NORMAL
PH UR STRIP: 5 (ref 5–8)
PLATELET # BLD AUTO: 229 K/UL (ref 150–400)
PLATELET # BLD AUTO: 266 K/UL (ref 150–400)
PMV BLD AUTO: 11.3 FL (ref 8.9–12.9)
PMV BLD AUTO: 12.1 FL (ref 8.9–12.9)
POTASSIUM SERPL-SCNC: 5.1 MMOL/L (ref 3.5–5.1)
PROT SERPL-MCNC: 6.8 G/DL (ref 6.4–8.2)
PROT UR STRIP-MCNC: NEGATIVE MG/DL
PROTHROMBIN TIME: 15.1 SEC (ref 11.9–14.1)
RBC # BLD AUTO: 3.19 M/UL (ref 3.8–5.2)
RBC # BLD AUTO: 3.56 M/UL (ref 3.8–5.2)
RBC #/AREA URNS HPF: NORMAL /HPF (ref 0–5)
SARS-COV-2 RNA RESP QL NAA+PROBE: NOT DETECTED
SODIUM SERPL-SCNC: 136 MMOL/L (ref 136–145)
SP GR UR REFRACTOMETRY: 1.01 (ref 1–1.03)
THERAPEUTIC RANGE: NORMAL SEC (ref 82–109)
TROPONIN I SERPL HS-MCNC: 1832 NG/L (ref 0–51)
TROPONIN I SERPL HS-MCNC: 1844 NG/L (ref 0–51)
TROPONIN I SERPL HS-MCNC: 1929 NG/L (ref 0–51)
UFH PPP CHRO-ACNC: 0.16 IU/ML
UFH PPP CHRO-ACNC: <0.1 IU/ML
URINE CULTURE IF INDICATED: NORMAL
UROBILINOGEN UR QL STRIP.AUTO: 0.1 EU/DL (ref 0.1–1)
WBC # BLD AUTO: 17 K/UL (ref 3.6–11)
WBC # BLD AUTO: 19.3 K/UL (ref 3.6–11)
WBC URNS QL MICRO: NORMAL /HPF (ref 0–4)

## 2025-06-26 PROCEDURE — 6370000000 HC RX 637 (ALT 250 FOR IP): Performed by: INTERNAL MEDICINE

## 2025-06-26 PROCEDURE — 94660 CPAP INITIATION&MGMT: CPT

## 2025-06-26 PROCEDURE — 2060000000 HC ICU INTERMEDIATE R&B

## 2025-06-26 PROCEDURE — 36415 COLL VENOUS BLD VENIPUNCTURE: CPT

## 2025-06-26 PROCEDURE — 6360000004 HC RX CONTRAST MEDICATION: Performed by: INTERNAL MEDICINE

## 2025-06-26 PROCEDURE — 96375 TX/PRO/DX INJ NEW DRUG ADDON: CPT

## 2025-06-26 PROCEDURE — 81001 URINALYSIS AUTO W/SCOPE: CPT

## 2025-06-26 PROCEDURE — 6360000002 HC RX W HCPCS: Performed by: INTERNAL MEDICINE

## 2025-06-26 PROCEDURE — 93005 ELECTROCARDIOGRAM TRACING: CPT

## 2025-06-26 PROCEDURE — 87040 BLOOD CULTURE FOR BACTERIA: CPT

## 2025-06-26 PROCEDURE — 84484 ASSAY OF TROPONIN QUANT: CPT

## 2025-06-26 PROCEDURE — 83605 ASSAY OF LACTIC ACID: CPT

## 2025-06-26 PROCEDURE — 83880 ASSAY OF NATRIURETIC PEPTIDE: CPT

## 2025-06-26 PROCEDURE — 99291 CRITICAL CARE FIRST HOUR: CPT

## 2025-06-26 PROCEDURE — 85610 PROTHROMBIN TIME: CPT

## 2025-06-26 PROCEDURE — 2500000003 HC RX 250 WO HCPCS: Performed by: INTERNAL MEDICINE

## 2025-06-26 PROCEDURE — 85025 COMPLETE CBC W/AUTO DIFF WBC: CPT

## 2025-06-26 PROCEDURE — 85730 THROMBOPLASTIN TIME PARTIAL: CPT

## 2025-06-26 PROCEDURE — 82962 GLUCOSE BLOOD TEST: CPT

## 2025-06-26 PROCEDURE — 6370000000 HC RX 637 (ALT 250 FOR IP): Performed by: STUDENT IN AN ORGANIZED HEALTH CARE EDUCATION/TRAINING PROGRAM

## 2025-06-26 PROCEDURE — 94761 N-INVAS EAR/PLS OXIMETRY MLT: CPT

## 2025-06-26 PROCEDURE — 80053 COMPREHEN METABOLIC PANEL: CPT

## 2025-06-26 PROCEDURE — 71045 X-RAY EXAM CHEST 1 VIEW: CPT

## 2025-06-26 PROCEDURE — 85027 COMPLETE CBC AUTOMATED: CPT

## 2025-06-26 PROCEDURE — 2500000003 HC RX 250 WO HCPCS

## 2025-06-26 PROCEDURE — 87636 SARSCOV2 & INF A&B AMP PRB: CPT

## 2025-06-26 PROCEDURE — 2700000000 HC OXYGEN THERAPY PER DAY

## 2025-06-26 PROCEDURE — 85520 HEPARIN ASSAY: CPT

## 2025-06-26 PROCEDURE — 96374 THER/PROPH/DIAG INJ IV PUSH: CPT

## 2025-06-26 PROCEDURE — 94762 N-INVAS EAR/PLS OXIMTRY CONT: CPT

## 2025-06-26 PROCEDURE — 6360000002 HC RX W HCPCS

## 2025-06-26 PROCEDURE — C8929 TTE W OR WO FOL WCON,DOPPLER: HCPCS

## 2025-06-26 RX ORDER — ONDANSETRON 2 MG/ML
4 INJECTION INTRAMUSCULAR; INTRAVENOUS EVERY 6 HOURS PRN
Status: DISCONTINUED | OUTPATIENT
Start: 2025-06-26 | End: 2025-06-28 | Stop reason: HOSPADM

## 2025-06-26 RX ORDER — CARVEDILOL 3.12 MG/1
6.25 TABLET ORAL 2 TIMES DAILY
Status: DISCONTINUED | OUTPATIENT
Start: 2025-06-26 | End: 2025-06-28 | Stop reason: HOSPADM

## 2025-06-26 RX ORDER — ATORVASTATIN CALCIUM 40 MG/1
80 TABLET, FILM COATED ORAL DAILY
Status: DISCONTINUED | OUTPATIENT
Start: 2025-06-26 | End: 2025-06-27

## 2025-06-26 RX ORDER — HEPARIN SODIUM 10000 [USP'U]/100ML
5-30 INJECTION, SOLUTION INTRAVENOUS CONTINUOUS
Status: DISCONTINUED | OUTPATIENT
Start: 2025-06-26 | End: 2025-06-27

## 2025-06-26 RX ORDER — FUROSEMIDE 10 MG/ML
40 INJECTION INTRAMUSCULAR; INTRAVENOUS
Status: COMPLETED | OUTPATIENT
Start: 2025-06-26 | End: 2025-06-26

## 2025-06-26 RX ORDER — CLOPIDOGREL BISULFATE 75 MG/1
75 TABLET ORAL DAILY
Status: DISCONTINUED | OUTPATIENT
Start: 2025-06-26 | End: 2025-06-28 | Stop reason: HOSPADM

## 2025-06-26 RX ORDER — GLUCAGON 1 MG/ML
1 KIT INJECTION PRN
Status: DISCONTINUED | OUTPATIENT
Start: 2025-06-26 | End: 2025-06-28 | Stop reason: HOSPADM

## 2025-06-26 RX ORDER — HEPARIN SODIUM 1000 [USP'U]/ML
60 INJECTION, SOLUTION INTRAVENOUS; SUBCUTANEOUS ONCE
Status: COMPLETED | OUTPATIENT
Start: 2025-06-26 | End: 2025-06-26

## 2025-06-26 RX ORDER — ISOSORBIDE MONONITRATE 60 MG/1
60 TABLET, EXTENDED RELEASE ORAL DAILY
Status: DISCONTINUED | OUTPATIENT
Start: 2025-06-26 | End: 2025-06-28 | Stop reason: HOSPADM

## 2025-06-26 RX ORDER — ACETAMINOPHEN 325 MG/1
650 TABLET ORAL EVERY 6 HOURS PRN
Status: DISCONTINUED | OUTPATIENT
Start: 2025-06-26 | End: 2025-06-28 | Stop reason: HOSPADM

## 2025-06-26 RX ORDER — AMLODIPINE BESYLATE 5 MG/1
10 TABLET ORAL DAILY
Status: DISCONTINUED | OUTPATIENT
Start: 2025-06-26 | End: 2025-06-28 | Stop reason: HOSPADM

## 2025-06-26 RX ORDER — ZOLPIDEM TARTRATE 5 MG/1
10 TABLET ORAL NIGHTLY PRN
Status: DISCONTINUED | OUTPATIENT
Start: 2025-06-26 | End: 2025-06-28 | Stop reason: HOSPADM

## 2025-06-26 RX ORDER — DEXTROSE MONOHYDRATE 100 MG/ML
INJECTION, SOLUTION INTRAVENOUS CONTINUOUS PRN
Status: DISCONTINUED | OUTPATIENT
Start: 2025-06-26 | End: 2025-06-28 | Stop reason: HOSPADM

## 2025-06-26 RX ORDER — HEPARIN SODIUM 1000 [USP'U]/ML
60 INJECTION, SOLUTION INTRAVENOUS; SUBCUTANEOUS PRN
Status: DISCONTINUED | OUTPATIENT
Start: 2025-06-26 | End: 2025-06-27

## 2025-06-26 RX ORDER — HEPARIN SODIUM 1000 [USP'U]/ML
30 INJECTION, SOLUTION INTRAVENOUS; SUBCUTANEOUS PRN
Status: DISCONTINUED | OUTPATIENT
Start: 2025-06-26 | End: 2025-06-27

## 2025-06-26 RX ORDER — POLYETHYLENE GLYCOL 3350 17 G/17G
17 POWDER, FOR SOLUTION ORAL DAILY PRN
Status: DISCONTINUED | OUTPATIENT
Start: 2025-06-26 | End: 2025-06-28 | Stop reason: HOSPADM

## 2025-06-26 RX ORDER — POTASSIUM CHLORIDE 1500 MG/1
40 TABLET, EXTENDED RELEASE ORAL PRN
Status: DISCONTINUED | OUTPATIENT
Start: 2025-06-26 | End: 2025-06-28 | Stop reason: HOSPADM

## 2025-06-26 RX ORDER — FUROSEMIDE 10 MG/ML
40 INJECTION INTRAMUSCULAR; INTRAVENOUS 2 TIMES DAILY
Status: DISCONTINUED | OUTPATIENT
Start: 2025-06-26 | End: 2025-06-28 | Stop reason: HOSPADM

## 2025-06-26 RX ORDER — POTASSIUM CHLORIDE 7.45 MG/ML
10 INJECTION INTRAVENOUS PRN
Status: DISCONTINUED | OUTPATIENT
Start: 2025-06-26 | End: 2025-06-28 | Stop reason: HOSPADM

## 2025-06-26 RX ORDER — ACETAMINOPHEN 650 MG/1
650 SUPPOSITORY RECTAL EVERY 6 HOURS PRN
Status: DISCONTINUED | OUTPATIENT
Start: 2025-06-26 | End: 2025-06-28 | Stop reason: HOSPADM

## 2025-06-26 RX ORDER — ASPIRIN 81 MG/1
81 TABLET ORAL DAILY
Status: DISCONTINUED | OUTPATIENT
Start: 2025-06-26 | End: 2025-06-28 | Stop reason: HOSPADM

## 2025-06-26 RX ORDER — INSULIN LISPRO 100 [IU]/ML
0-8 INJECTION, SOLUTION INTRAVENOUS; SUBCUTANEOUS
Status: DISCONTINUED | OUTPATIENT
Start: 2025-06-26 | End: 2025-06-28 | Stop reason: HOSPADM

## 2025-06-26 RX ORDER — PANTOPRAZOLE SODIUM 40 MG/1
40 TABLET, DELAYED RELEASE ORAL
Status: DISCONTINUED | OUTPATIENT
Start: 2025-06-26 | End: 2025-06-27

## 2025-06-26 RX ORDER — SODIUM CHLORIDE 9 MG/ML
INJECTION, SOLUTION INTRAVENOUS PRN
Status: DISCONTINUED | OUTPATIENT
Start: 2025-06-26 | End: 2025-06-28 | Stop reason: HOSPADM

## 2025-06-26 RX ORDER — INSULIN GLARGINE 100 [IU]/ML
15 INJECTION, SOLUTION SUBCUTANEOUS NIGHTLY
COMMUNITY

## 2025-06-26 RX ORDER — DULOXETIN HYDROCHLORIDE 30 MG/1
60 CAPSULE, DELAYED RELEASE ORAL DAILY
Status: DISCONTINUED | OUTPATIENT
Start: 2025-06-26 | End: 2025-06-28 | Stop reason: HOSPADM

## 2025-06-26 RX ORDER — TIZANIDINE 2 MG/1
2 TABLET ORAL 2 TIMES DAILY
Status: DISCONTINUED | OUTPATIENT
Start: 2025-06-26 | End: 2025-06-28 | Stop reason: HOSPADM

## 2025-06-26 RX ORDER — SODIUM CHLORIDE 0.9 % (FLUSH) 0.9 %
5-40 SYRINGE (ML) INJECTION PRN
Status: DISCONTINUED | OUTPATIENT
Start: 2025-06-26 | End: 2025-06-28 | Stop reason: HOSPADM

## 2025-06-26 RX ORDER — SODIUM CHLORIDE 0.9 % (FLUSH) 0.9 %
5-40 SYRINGE (ML) INJECTION EVERY 12 HOURS SCHEDULED
Status: DISCONTINUED | OUTPATIENT
Start: 2025-06-26 | End: 2025-06-28 | Stop reason: HOSPADM

## 2025-06-26 RX ORDER — MAGNESIUM SULFATE IN WATER 40 MG/ML
2000 INJECTION, SOLUTION INTRAVENOUS PRN
Status: DISCONTINUED | OUTPATIENT
Start: 2025-06-26 | End: 2025-06-28 | Stop reason: HOSPADM

## 2025-06-26 RX ORDER — BUSPIRONE HYDROCHLORIDE 10 MG/1
10 TABLET ORAL 3 TIMES DAILY
Status: DISCONTINUED | OUTPATIENT
Start: 2025-06-26 | End: 2025-06-28 | Stop reason: HOSPADM

## 2025-06-26 RX ORDER — ONDANSETRON 4 MG/1
4 TABLET, ORALLY DISINTEGRATING ORAL EVERY 8 HOURS PRN
Status: DISCONTINUED | OUTPATIENT
Start: 2025-06-26 | End: 2025-06-28 | Stop reason: HOSPADM

## 2025-06-26 RX ADMIN — HEPARIN SODIUM 1900 UNITS: 1000 INJECTION INTRAVENOUS; SUBCUTANEOUS at 19:24

## 2025-06-26 RX ADMIN — ISOSORBIDE MONONITRATE 60 MG: 60 TABLET, EXTENDED RELEASE ORAL at 09:16

## 2025-06-26 RX ADMIN — SULFUR HEXAFLUORIDE 2 ML: 60.7; .19; .19 INJECTION, POWDER, LYOPHILIZED, FOR SUSPENSION INTRAVENOUS; INTRAVESICAL at 14:19

## 2025-06-26 RX ADMIN — CARVEDILOL 6.25 MG: 3.12 TABLET, FILM COATED ORAL at 21:51

## 2025-06-26 RX ADMIN — ASPIRIN 81 MG: 81 TABLET, DELAYED RELEASE ORAL at 09:51

## 2025-06-26 RX ADMIN — ATORVASTATIN CALCIUM 80 MG: 40 TABLET, FILM COATED ORAL at 09:51

## 2025-06-26 RX ADMIN — DULOXETINE 60 MG: 30 CAPSULE, DELAYED RELEASE ORAL at 21:52

## 2025-06-26 RX ADMIN — AMLODIPINE BESYLATE 10 MG: 5 TABLET ORAL at 09:55

## 2025-06-26 RX ADMIN — FUROSEMIDE 40 MG: 10 INJECTION, SOLUTION INTRAMUSCULAR; INTRAVENOUS at 06:30

## 2025-06-26 RX ADMIN — HEPARIN SODIUM 12 UNITS/KG/HR: 10000 INJECTION, SOLUTION INTRAVENOUS at 10:02

## 2025-06-26 RX ADMIN — INSULIN LISPRO 6 UNITS: 100 INJECTION, SOLUTION INTRAVENOUS; SUBCUTANEOUS at 17:46

## 2025-06-26 RX ADMIN — FUROSEMIDE 40 MG: 10 INJECTION, SOLUTION INTRAMUSCULAR; INTRAVENOUS at 17:31

## 2025-06-26 RX ADMIN — TIZANIDINE 2 MG: 2 TABLET ORAL at 21:52

## 2025-06-26 RX ADMIN — CEFTRIAXONE SODIUM 1000 MG: 1 INJECTION, POWDER, FOR SOLUTION INTRAMUSCULAR; INTRAVENOUS at 05:54

## 2025-06-26 RX ADMIN — PANTOPRAZOLE SODIUM 40 MG: 40 TABLET, DELAYED RELEASE ORAL at 09:55

## 2025-06-26 RX ADMIN — CLOPIDOGREL BISULFATE 75 MG: 75 TABLET, FILM COATED ORAL at 09:51

## 2025-06-26 RX ADMIN — SODIUM CHLORIDE, PRESERVATIVE FREE 10 ML: 5 INJECTION INTRAVENOUS at 21:25

## 2025-06-26 RX ADMIN — CARVEDILOL 6.25 MG: 3.12 TABLET, FILM COATED ORAL at 09:51

## 2025-06-26 RX ADMIN — SERTRALINE HYDROCHLORIDE 100 MG: 50 TABLET ORAL at 21:51

## 2025-06-26 RX ADMIN — ZOLPIDEM TARTRATE 10 MG: 5 TABLET ORAL at 22:15

## 2025-06-26 RX ADMIN — POLYETHYLENE GLYCOL 3350 17 G: 17 POWDER, FOR SOLUTION ORAL at 17:42

## 2025-06-26 RX ADMIN — HEPARIN SODIUM 3700 UNITS: 1000 INJECTION INTRAVENOUS; SUBCUTANEOUS at 09:59

## 2025-06-26 RX ADMIN — BUSPIRONE HYDROCHLORIDE 10 MG: 10 TABLET ORAL at 17:31

## 2025-06-26 RX ADMIN — BUSPIRONE HYDROCHLORIDE 10 MG: 10 TABLET ORAL at 09:51

## 2025-06-26 RX ADMIN — BUSPIRONE HYDROCHLORIDE 10 MG: 10 TABLET ORAL at 21:52

## 2025-06-26 ASSESSMENT — PAIN - FUNCTIONAL ASSESSMENT: PAIN_FUNCTIONAL_ASSESSMENT: NONE - DENIES PAIN

## 2025-06-26 ASSESSMENT — LIFESTYLE VARIABLES
HOW OFTEN DO YOU HAVE A DRINK CONTAINING ALCOHOL: NEVER
HOW MANY STANDARD DRINKS CONTAINING ALCOHOL DO YOU HAVE ON A TYPICAL DAY: PATIENT DOES NOT DRINK

## 2025-06-26 ASSESSMENT — PAIN SCALES - GENERAL: PAINLEVEL_OUTOF10: 0

## 2025-06-26 NOTE — ED NOTES
This nurse has been attempting to contact Dr. Romero to provide repeat troponin result both via perfect serve and phone but having issues with perfect serve connecting

## 2025-06-26 NOTE — CONSULTS
6/26/2025, 10:40 AM        [facility]  [address] [id number]  Phone: [phone]      Cardiology Consult      6/26/2025, 10:40 AM      Eliz Villalba  59 y.o. female  1965      Admit Date:  6/26/2025    Primary Cardiologist:  Dr. Kwong  Reason for consult:  NSTEMI  Requesting provider:  Dr. Peter    Impression:       Acute on chronic HFrEF.  Repeat echo on this admission shows LV ejection fraction of 35% with mild to moderate MR  Elevated troponin which appears to be type II non-STEMI.  Status post recent left renal stenting by Dr. Kwong, 6/18/2025  Status post three-vessel CABG, 2010 with recent PCI of WILLETT to LAD, [facility] [place] [facility], 3-2025.  PAD with history of bilateral iliac stenting.  History of CVA     Plan:       Agree with IV Lasix for diuresis with watch on electrolyte.  Continue DAPT with nitrates, beta-blockers, IV heparin and high-dose statin for empiric antianginal coverage.  Agree with continued use of amlodipine for blood pressure control.    Invasive versus noninvasive workup of CAD discussed with the patient.  Patient leaning for noninvasive testing of ischemia which can be done once patient clears from CHF.  Will follow patient with you.  S he wants a partner ubjective      This is a 59-year-old female with a history of HFrEF with LVEF 40-45%, 6-24-25, CAD, status post three-vessel CABG 2010 at [facility], status post PTCA of LIMA to LAD, 3-2025,[facility], PAD with bilateral iliac stenting, type 2 diabetes mellitus, hypertension, hypercholesterolemia, fibromyalgia and status post CVA who recently on 6/18/2025 underwent angioplasty/stenting of left renal artery by Dr. Kwong at [facility].  Patient 2 days ago was discharged from [facility] post management of HFrEF with type II non-STEMI.   Patient comes to the ER with complaints of sudden onset of shortness of breath which woke patient up from sleep this morning.  She reported associated chest tightness/congestion and dry cough.

## 2025-06-26 NOTE — PROGRESS NOTES
4 Eyes Skin Assessment     NAME:  Eliz Villalba  YOB: 1965  MEDICAL RECORD NUMBER:  [id number]    The patient is being assessed for  Admission    I agree that at least one RN has performed a thorough Head to Toe Skin Assessment on the patient. ALL assessment sites listed below have been assessed.      Areas assessed by both nurses:    Head, Face, Ears, Shoulders, Back, Chest, Arms, Elbows, Hands, Sacrum. Buttock, Coccyx, Ischium, Legs. Feet and Heels, and Under Medical Devices         Does the Patient have a Wound? No noted wound(s)       Ricco Prevention initiated by RN: No  Wound Care Orders initiated by RN: No    Pressure Injury (Stage 1,2,3,4, Unstageable, DTI, NWPT, and Complex wounds) if present, place Wound referral order by RN under : No    New Ostomies, if present place, Ostomy referral order under : No     Nurse 1 eSignature: Electronically signed by Jp Aldana RN on 6/26/25 at 5:56 PM EDT    **SHARE this note so that the co-signing nurse can place an eSignature**    Nurse 2 eSignature: Electronically signed by Siobhan Alex RN on 6/26/25 at 5:57 PM EDT

## 2025-06-26 NOTE — PROGRESS NOTES
Received Order for Telemetry     Eliz Villalba   1965   [id number]   Acute respiratory failure (HCC) [J96.00]  NSTEMI (non-ST elevated myocardial infarction) ([facility]) [I21.4]   Zulma Peter*     Tele Box # 41 placed on patient at 1148am  ER Room # n/a  Admitting to Room 487  Transferring Nurse: did not receive call from ed   Verified with Primary Nurse araceli at 1148 am

## 2025-06-26 NOTE — ED NOTES
This nurse called pharmacy and inquiired about tizanidine and isosorbide since [facility] and was advised that their tubing station is down and the floor the patient is going to has it in their pyxis. This nurse called and savana Marquis Rn and advised that the patient has not received isosorbide or the tizanidine due to pharmacy not sending it to the ED and that it is in their pyxis. Receiving RN stated will give meds on the floor once patient arrives. Pt will be trnsported to the floor via bedside monitor and the floor has tele box on the floor for when patient arrives to floor

## 2025-06-26 NOTE — ED TRIAGE NOTES
Hx CHF and MI, SOB starting @0200  Pt arrived by ems on bypap running 7&4 O2 100,   Pt receive nitro paste and 0.4 sublingual niro normal sinus on 12 lead

## 2025-06-26 NOTE — H&P
Hospitalist Admission Note    NAME:   Eliz Villalba   : 1965   MRN: [id number]     Date/Time: 2025 12:34 PM    Patient PCP: Val Perez APRN - [facility]    ______________________________________________________________________  Given the patient's current clinical presentation, I have a high level of concern for decompensation if discharged from the emergency department.  Complex decision making was performed, which includes reviewing the patient's available past medical records, laboratory results, and x-ray films.       My assessment of this patient's clinical condition and my plan of care is as follows.    Assessment / Plan:    Acute respiratory failure with hypoxia  Acute decompensated heart failure with systolic dysfunction  Of note patient weaned off of BiPAP, remains on supplemental oxygen  Daily weights  Frequent intake and output monitoring  Lasix 40 mg IV twice daily  Previous transthoracic echo reviewed  Cardiology consult further evaluation    Elevated serum troponin  Non-ST elevation MI  Continue current medications  Continue heparin GTT for 48 hours of anticoagulation  Cardiology consult further evaluation    Hypertension-continue current antihypertensive medications    Gastroesophageal reflux disease-continue current medications    Depression  Anxiety  Continue current medications    Hyperglycemia type 2 diabetes-insulin sliding scale    Prophylaxis-heparin GTT  FEN-cardiac/diabetic diet, replete potassium magnesium  Full code, will clarify about surrogate decision maker, admitted to cardiac telemetry further management                  Medical Decision Making:   I personally reviewed labs: CBC, CMP  I personally reviewed imaging: Chest x-ray, consistent with volume overload, consistent with bilateral pulmonary edema  I personally reviewed EKG/telemetry strip:  Toxic drug monitoring: Lasix, monitor for acute kidney injury, monitor for hyperkalemia, daily BMP  Discussed case with: ED  adenopathy. Common iliac artery atherosclerotic changes severe bilaterally. OSSEOUS STRUCTURES: No destructive bone lesion or acute fracture.     Trace retroperitoneal hemorrhage on the left. No pseudoaneurysm is identified within the identified. Severe atherosclerotic changes of the common iliac arteries on the right and on the left. Incidental and/or nonemergent findings are as described above.  Electronically signed by VINCENT GOODMAN     _______________________________________________________________________    TOTAL TIME:  76 Minutes    Critical Care Provided     Minutes non procedure based    Signed: Zulma Peter MD    Procedures: see electronic medical records for all procedures/Xrays and details which were not copied into this note but were reviewed prior to creation of Plan.

## 2025-06-26 NOTE — ED PROVIDER NOTES
[facility] EMERGENCY DEPT  EMERGENCY DEPARTMENT HISTORY AND PHYSICAL EXAM      Date of evaluation: 2025  Patient Name: Eliz Villalba  Birthdate 1965  MRN: [id number]  ED Provider: Asha Trotter MD   Note Started: 1:30 PM EDT 25    HISTORY OF PRESENT ILLNESS     Chief Complaint   Patient presents with   • Shortness of Breath       History Provided By: Patient, EMS     HPI: Eliz Villalba is a 59 y.o. female  with past medical history as below who presents with shortness of breath.  Per EMS, patient was satting in the 80s on room air, had increased work of breathing, was placed on CPAP.  Also noted to be hypertensive, they applied an inch of Nitropaste. Pt denies chest pain. Does report a cough. Denies fever.   PAST MEDICAL HISTORY   Past Medical History:  Past Medical History:   Diagnosis Date   • CAD (coronary artery disease)    • Diabetes (HCC)    • Fibromyalgia    • Hyperlipidemia    • Hypertension    • Lupus    • Neuropathy    • Stroke (HCC) 2015       Past Surgical History:  Past Surgical History:   Procedure Laterality Date   • CHOLECYSTECTOMY     • PARTIAL HYSTERECTOMY (CERVIX NOT REMOVED)     • TONSILLECTOMY     • TUBAL LIGATION     • VASCULAR SURGERY Bilateral             Family History:  Family History   Problem Relation Age of Onset   • Cancer Mother    • Cancer Father        Social History:  Social History     Tobacco Use   • Smoking status: Former     Current packs/day: 0.00     Types: Cigarettes     Quit date: 2017     Years since quittin.1   • Smokeless tobacco: Never   Substance Use Topics   • Alcohol use: Not Currently   • Drug use: Never       Allergies:  Allergies   Allergen Reactions   • Codeine Hives   • Percocet [Oxycodone-Acetaminophen] Hives   • Sulfa Antibiotics Other (See Comments)     Blisters     • Trazodone Other (See Comments)     Makes pt angry   • Lyrica [Pregabalin] Palpitations   • Metformin Nausea And Vomiting       PCP: Val Perez APRN -  5-40 mL (has no administration in time range)   sodium chloride flush 0.9 % injection 5-40 mL (has no administration in time range)   0.9 % sodium chloride infusion (has no administration in time range)   potassium chloride (KLOR-CON M) extended release tablet 40 mEq (has no administration in time range)     Or   potassium bicarb-citric acid (EFFER-K) effervescent tablet 40 mEq (has no administration in time range)     Or   potassium chloride 10 mEq/100 mL IVPB (Peripheral Line) (has no administration in time range)   magnesium sulfate 2000 mg in 50 mL IVPB premix (has no administration in time range)   ondansetron (ZOFRAN-ODT) disintegrating tablet 4 mg (has no administration in time range)     Or   ondansetron (ZOFRAN) injection 4 mg (has no administration in time range)   polyethylene glycol (GLYCOLAX) packet 17 g (has no administration in time range)   acetaminophen (TYLENOL) tablet 650 mg (has no administration in time range)     Or   acetaminophen (TYLENOL) suppository 650 mg (has no administration in time range)   glucose chewable tablet 16 g (has no administration in time range)   dextrose bolus 10% 125 mL (has no administration in time range)     Or   dextrose bolus 10% 250 mL (has no administration in time range)   glucagon injection 1 mg (has no administration in time range)   dextrose 10 % infusion (has no administration in time range)   insulin lispro (HUMALOG,ADMELOG) injection vial 0-8 Units (has no administration in time range)   cefTRIAXone (ROCEPHIN) 1,000 mg in sterile water 10 mL IV syringe (1,000 mg IntraVENous Given 6/26/25 0554)   furosemide (LASIX) injection 40 mg (40 mg IntraVENous Given 6/26/25 0630)   heparin (porcine) injection 3,700 Units (3,700 Units IntraVENous Given 6/26/25 0959)       CONSULTS: See ED Course/MDM for further details.  IP CONSULT TO CARDIOLOGY   PROCEDURES   Unless otherwise noted above, none  Procedures    SEPSIS REASSESSMENT & CRITICAL CARE TIME   SEPSIS

## 2025-06-27 ENCOUNTER — APPOINTMENT (OUTPATIENT)
Facility: HOSPITAL | Age: 60
End: 2025-06-27
Attending: INTERNAL MEDICINE
Payer: MEDICARE

## 2025-06-27 LAB
ALBUMIN SERPL-MCNC: 2.9 G/DL (ref 3.5–5)
ALBUMIN/GLOB SERPL: 0.8 (ref 1.1–2.2)
ALP SERPL-CCNC: 101 U/L (ref 45–117)
ALT SERPL-CCNC: 15 U/L (ref 12–78)
ANION GAP SERPL CALC-SCNC: 5 MMOL/L (ref 2–12)
AST SERPL W P-5'-P-CCNC: 14 U/L (ref 15–37)
BASOPHILS # BLD: 0.06 K/UL (ref 0–0.1)
BASOPHILS NFR BLD: 0.5 % (ref 0–1)
BILIRUB SERPL-MCNC: 0.3 MG/DL (ref 0.2–1)
BUN SERPL-MCNC: 31 MG/DL (ref 6–20)
BUN/CREAT SERPL: 23 (ref 12–20)
CA-I BLD-MCNC: 9 MG/DL (ref 8.5–10.1)
CHLORIDE SERPL-SCNC: 105 MMOL/L (ref 97–108)
CO2 SERPL-SCNC: 28 MMOL/L (ref 21–32)
CREAT SERPL-MCNC: 1.36 MG/DL (ref 0.55–1.02)
DIFFERENTIAL METHOD BLD: ABNORMAL
EKG ATRIAL RATE: 90 BPM
EKG DIAGNOSIS: NORMAL
EKG P AXIS: 56 DEGREES
EKG P-R INTERVAL: 152 MS
EKG Q-T INTERVAL: 356 MS
EKG QRS DURATION: 90 MS
EKG QTC CALCULATION (BAZETT): 435 MS
EKG R AXIS: 12 DEGREES
EKG T AXIS: 117 DEGREES
EKG VENTRICULAR RATE: 90 BPM
EOSINOPHIL # BLD: 0.25 K/UL (ref 0–0.4)
EOSINOPHIL NFR BLD: 2 % (ref 0–7)
ERYTHROCYTE [DISTWIDTH] IN BLOOD BY AUTOMATED COUNT: 13.2 % (ref 11.5–14.5)
EST. AVERAGE GLUCOSE BLD GHB EST-MCNC: 203 MG/DL
GLOBULIN SER CALC-MCNC: 3.6 G/DL (ref 2–4)
GLUCOSE BLD STRIP.AUTO-MCNC: 195 MG/DL (ref 65–100)
GLUCOSE BLD STRIP.AUTO-MCNC: 202 MG/DL (ref 65–100)
GLUCOSE BLD STRIP.AUTO-MCNC: 293 MG/DL (ref 65–100)
GLUCOSE BLD STRIP.AUTO-MCNC: 328 MG/DL (ref 65–100)
GLUCOSE SERPL-MCNC: 226 MG/DL (ref 65–100)
HBA1C MFR BLD: 8.7 % (ref 4–5.6)
HCT VFR BLD AUTO: 26 % (ref 35–47)
HGB BLD-MCNC: 8.5 G/DL (ref 11.5–16)
IMM GRANULOCYTES # BLD AUTO: 0.11 K/UL (ref 0–0.04)
IMM GRANULOCYTES NFR BLD AUTO: 0.9 % (ref 0–0.5)
LYMPHOCYTES # BLD: 2.29 K/UL (ref 0.8–3.5)
LYMPHOCYTES NFR BLD: 18.6 % (ref 12–49)
MCH RBC QN AUTO: 27.2 PG (ref 26–34)
MCHC RBC AUTO-ENTMCNC: 32.7 G/DL (ref 30–36.5)
MCV RBC AUTO: 83.3 FL (ref 80–99)
MONOCYTES # BLD: 1.16 K/UL (ref 0–1)
MONOCYTES NFR BLD: 9.4 % (ref 5–13)
NEUTS SEG # BLD: 8.42 K/UL (ref 1.8–8)
NEUTS SEG NFR BLD: 68.6 % (ref 32–75)
NRBC # BLD: 0 K/UL (ref 0–0.01)
NRBC BLD-RTO: 0 PER 100 WBC
PERFORMED BY:: ABNORMAL
PLATELET # BLD AUTO: 263 K/UL (ref 150–400)
PMV BLD AUTO: 11.5 FL (ref 8.9–12.9)
POTASSIUM SERPL-SCNC: 4 MMOL/L (ref 3.5–5.1)
PROT SERPL-MCNC: 6.5 G/DL (ref 6.4–8.2)
RBC # BLD AUTO: 3.12 M/UL (ref 3.8–5.2)
SODIUM SERPL-SCNC: 138 MMOL/L (ref 136–145)
UFH PPP CHRO-ACNC: 0.18 IU/ML
WBC # BLD AUTO: 12.3 K/UL (ref 3.6–11)

## 2025-06-27 PROCEDURE — 6360000002 HC RX W HCPCS: Performed by: INTERNAL MEDICINE

## 2025-06-27 PROCEDURE — 36415 COLL VENOUS BLD VENIPUNCTURE: CPT

## 2025-06-27 PROCEDURE — 82962 GLUCOSE BLOOD TEST: CPT

## 2025-06-27 PROCEDURE — 83036 HEMOGLOBIN GLYCOSYLATED A1C: CPT

## 2025-06-27 PROCEDURE — 2500000003 HC RX 250 WO HCPCS: Performed by: INTERNAL MEDICINE

## 2025-06-27 PROCEDURE — 97165 OT EVAL LOW COMPLEX 30 MIN: CPT

## 2025-06-27 PROCEDURE — 2060000000 HC ICU INTERMEDIATE R&B

## 2025-06-27 PROCEDURE — 6370000000 HC RX 637 (ALT 250 FOR IP): Performed by: STUDENT IN AN ORGANIZED HEALTH CARE EDUCATION/TRAINING PROGRAM

## 2025-06-27 PROCEDURE — 93926 LOWER EXTREMITY STUDY: CPT

## 2025-06-27 PROCEDURE — 80053 COMPREHEN METABOLIC PANEL: CPT

## 2025-06-27 PROCEDURE — 94761 N-INVAS EAR/PLS OXIMETRY MLT: CPT

## 2025-06-27 PROCEDURE — 97161 PT EVAL LOW COMPLEX 20 MIN: CPT

## 2025-06-27 PROCEDURE — 85025 COMPLETE CBC W/AUTO DIFF WBC: CPT

## 2025-06-27 PROCEDURE — 97530 THERAPEUTIC ACTIVITIES: CPT

## 2025-06-27 PROCEDURE — 94660 CPAP INITIATION&MGMT: CPT

## 2025-06-27 PROCEDURE — 6370000000 HC RX 637 (ALT 250 FOR IP): Performed by: INTERNAL MEDICINE

## 2025-06-27 PROCEDURE — 2700000000 HC OXYGEN THERAPY PER DAY

## 2025-06-27 PROCEDURE — 85520 HEPARIN ASSAY: CPT

## 2025-06-27 PROCEDURE — 97116 GAIT TRAINING THERAPY: CPT

## 2025-06-27 RX ORDER — PANTOPRAZOLE SODIUM 40 MG/1
40 TABLET, DELAYED RELEASE ORAL
Status: DISCONTINUED | OUTPATIENT
Start: 2025-06-27 | End: 2025-06-27

## 2025-06-27 RX ORDER — MAGNESIUM HYDROXIDE/ALUMINUM HYDROXICE/SIMETHICONE 120; 1200; 1200 MG/30ML; MG/30ML; MG/30ML
30 SUSPENSION ORAL EVERY 6 HOURS PRN
Status: DISCONTINUED | OUTPATIENT
Start: 2025-06-27 | End: 2025-06-27

## 2025-06-27 RX ORDER — HEPARIN SODIUM 5000 [USP'U]/ML
5000 INJECTION, SOLUTION INTRAVENOUS; SUBCUTANEOUS EVERY 8 HOURS SCHEDULED
Status: DISCONTINUED | OUTPATIENT
Start: 2025-06-27 | End: 2025-06-28 | Stop reason: HOSPADM

## 2025-06-27 RX ORDER — PANTOPRAZOLE SODIUM 40 MG/1
40 TABLET, DELAYED RELEASE ORAL
Status: DISCONTINUED | OUTPATIENT
Start: 2025-06-28 | End: 2025-06-28 | Stop reason: HOSPADM

## 2025-06-27 RX ORDER — ATORVASTATIN CALCIUM 40 MG/1
80 TABLET, FILM COATED ORAL NIGHTLY
Status: DISCONTINUED | OUTPATIENT
Start: 2025-06-28 | End: 2025-06-28 | Stop reason: HOSPADM

## 2025-06-27 RX ADMIN — ZOLPIDEM TARTRATE 10 MG: 5 TABLET ORAL at 20:54

## 2025-06-27 RX ADMIN — SODIUM CHLORIDE, PRESERVATIVE FREE 10 ML: 5 INJECTION INTRAVENOUS at 09:09

## 2025-06-27 RX ADMIN — SODIUM CHLORIDE, PRESERVATIVE FREE 10 ML: 5 INJECTION INTRAVENOUS at 20:44

## 2025-06-27 RX ADMIN — TIZANIDINE 2 MG: 2 TABLET ORAL at 20:54

## 2025-06-27 RX ADMIN — HEPARIN SODIUM 1900 UNITS: 1000 INJECTION INTRAVENOUS; SUBCUTANEOUS at 03:35

## 2025-06-27 RX ADMIN — ATORVASTATIN CALCIUM 80 MG: 40 TABLET, FILM COATED ORAL at 09:01

## 2025-06-27 RX ADMIN — POLYETHYLENE GLYCOL 3350 17 G: 17 POWDER, FOR SOLUTION ORAL at 17:24

## 2025-06-27 RX ADMIN — BUSPIRONE HYDROCHLORIDE 10 MG: 10 TABLET ORAL at 14:32

## 2025-06-27 RX ADMIN — CLOPIDOGREL BISULFATE 75 MG: 75 TABLET, FILM COATED ORAL at 09:03

## 2025-06-27 RX ADMIN — AMLODIPINE BESYLATE 10 MG: 5 TABLET ORAL at 09:00

## 2025-06-27 RX ADMIN — DULOXETINE 60 MG: 30 CAPSULE, DELAYED RELEASE ORAL at 20:55

## 2025-06-27 RX ADMIN — ASPIRIN 81 MG: 81 TABLET, DELAYED RELEASE ORAL at 08:59

## 2025-06-27 RX ADMIN — ISOSORBIDE MONONITRATE 60 MG: 60 TABLET, EXTENDED RELEASE ORAL at 09:07

## 2025-06-27 RX ADMIN — HEPARIN SODIUM 5000 UNITS: 5000 INJECTION INTRAVENOUS; SUBCUTANEOUS at 20:55

## 2025-06-27 RX ADMIN — INSULIN LISPRO 2 UNITS: 100 INJECTION, SOLUTION INTRAVENOUS; SUBCUTANEOUS at 09:07

## 2025-06-27 RX ADMIN — SERTRALINE HYDROCHLORIDE 100 MG: 50 TABLET ORAL at 20:55

## 2025-06-27 RX ADMIN — SACUBITRIL AND VALSARTAN 1 TABLET: 24; 26 TABLET, FILM COATED ORAL at 12:34

## 2025-06-27 RX ADMIN — HEPARIN SODIUM 5000 UNITS: 5000 INJECTION INTRAVENOUS; SUBCUTANEOUS at 14:31

## 2025-06-27 RX ADMIN — PANTOPRAZOLE SODIUM 40 MG: 40 TABLET, DELAYED RELEASE ORAL at 05:18

## 2025-06-27 RX ADMIN — BUSPIRONE HYDROCHLORIDE 10 MG: 10 TABLET ORAL at 09:02

## 2025-06-27 RX ADMIN — INSULIN LISPRO 6 UNITS: 100 INJECTION, SOLUTION INTRAVENOUS; SUBCUTANEOUS at 20:54

## 2025-06-27 RX ADMIN — INSULIN LISPRO 4 UNITS: 100 INJECTION, SOLUTION INTRAVENOUS; SUBCUTANEOUS at 12:29

## 2025-06-27 RX ADMIN — INSULIN LISPRO 2 UNITS: 100 INJECTION, SOLUTION INTRAVENOUS; SUBCUTANEOUS at 17:24

## 2025-06-27 RX ADMIN — FUROSEMIDE 40 MG: 10 INJECTION, SOLUTION INTRAMUSCULAR; INTRAVENOUS at 17:24

## 2025-06-27 RX ADMIN — BUSPIRONE HYDROCHLORIDE 10 MG: 10 TABLET ORAL at 20:55

## 2025-06-27 RX ADMIN — CARVEDILOL 6.25 MG: 3.12 TABLET, FILM COATED ORAL at 20:55

## 2025-06-27 RX ADMIN — FUROSEMIDE 40 MG: 10 INJECTION, SOLUTION INTRAMUSCULAR; INTRAVENOUS at 09:04

## 2025-06-27 RX ADMIN — SACUBITRIL AND VALSARTAN 1 TABLET: 24; 26 TABLET, FILM COATED ORAL at 20:54

## 2025-06-27 RX ADMIN — TIZANIDINE 2 MG: 2 TABLET ORAL at 09:10

## 2025-06-27 RX ADMIN — CARVEDILOL 6.25 MG: 3.12 TABLET, FILM COATED ORAL at 09:02

## 2025-06-27 ASSESSMENT — PAIN SCALES - GENERAL
PAINLEVEL_OUTOF10: 0

## 2025-06-27 NOTE — PLAN OF CARE
PHYSICAL THERAPY EVALUATION AND DISCHARGE  Patient: Eliz Villalba (59 y.o. female)  Date: 2025  Primary Diagnosis: Acute respiratory failure (HCC) [J96.00]  NSTEMI (non-ST elevated myocardial infarction) (HCC) [I21.4]       Precautions:                        Recommendations for nursing mobility: Out of bed to chair for meals, AD and gt belt for bed to chair , Amb to bathroom with AD and gait belt, and Assist x1    In place during session: Nasal Cannula 2L and EKG/telemetry     ASSESSMENT  Pt is a 59 y.o. female admitted on 2025 for SOB; pt currently being treated for acute respiratory failure with hypoxia and HF . Pt sitting on EOB with OT upon PT arrival, agreeable to evaluation. Pt A&O x 4.  RN requesting orthostatics on pt which were performed by OT/PT.   BP supine: 130/62  BP sittin/67  BP standin/55    Based on the objective data described below pt currently present at baseline  for transfers and mobility at this time. (See below for objective details and assist levels). Pt denies any dizziness or lightheadedness with sitting or standing today.  Pt amb 180 ft with no AD and pushing oxygen tank with continual pulse ox monitoring.  Pt currently on 2L O2 but states she is not on oxygen at home and doesn't feel like she needs it.  Oxygen turning off during gait session, but pt pushing O2 tank in case in needed to be turned on.  Pt's ambulatory pulse ox reading ranged from %.     Overall pt tolerated session well today with no reports of pain.  Pt is safe and steady with ambulation with no LOB and only mild SOB with exertion which she states is normal for her.  Pt has no skilled acute PT needs at this time noted by PT or reported by pt, will DC skilled PT following evaluation; pt verbalized understanding and agreement.  Current PT DC recommendation No post acute skilled physical therapy recommended at this time, d/c to home with family care once medically appropriate.     Start

## 2025-06-27 NOTE — PROGRESS NOTES
[facility] at [facility]  [address] [place] [address]  Phone: [phone]      Progress Note      6/27/2025 11:38 AM  NAME: Eliz Villalba   MRN:  [id number]   Admit Diagnosis: Acute respiratory failure (HCC) [J96.00]  NSTEMI (non-ST elevated myocardial infarction) (HCC) [I21.4]          Assessment/Plan:     Acute on chronic HFrEF.  Patient post diuresis with IV Lasix has cleared from CHF.  Repeat echocardiogram on this admission shows LV ejection fraction of 35-40% with mild to moderate MR.  I will start patient on Entresto to optimize anti-CHF regimen.  Elevated flat troponin which appears to be type II non-STEMI related to problem 1.  Patient is status post multiple PCI, last PCI of LIMA to LAD in March 2025 at [facility].  I have again discussed with the patient option of invasive versus noninvasive workup of CAD.  Patient is refusing further invasive cardiac intervention and wants to be treated medically.  Continue current antianginal regimen inclusive of DAPT and high-dose statin.  His status post three-vessel CABG with history of multiple PCI last PCI of LIMA to LAD, 3-2025  PAD status post bilateral iliac stenting.  History of CVA  Left groin hematoma post recent percutaneous intervention of renal artery.  Left groin ultrasound results pending.         []       High complexity decision making was performed in this patient at high risk for decompensation with multiple organ involvement.    Subjective:     Eliz Villalba denies chest pain, dyspnea.  Complains of left groin knot  Discussed with RN events overnight.     Review of Systems:     []         Unable to obtain  ROS due to ---   [x]         Total of 12 systems reviewed as follows:     Constitutional: negative fever, negative chills, negative weight loss  Eyes:               negative visual changes  ENT:                negative sore throat, tongue or lip swelling  Respiratory:     negative cough, negative dyspnea  Cards:

## 2025-06-27 NOTE — THERAPY EVALUATION
OCCUPATIONAL THERAPY EVALUATION AND DISCHARGE  Patient: Eliz Villalba (59 y.o. female)  Date: 6/27/2025  Primary Diagnosis: Acute respiratory failure ([facility]) [J96.00]  NSTEMI (non-ST elevated myocardial infarction) ([facility]) [I21.4]       Precautions:                  Recommendations for nursing mobility: Out of bed to chair for meals, AD and gt belt for bed to chair , Amb to bathroom with AD and gait belt, and Assist x1    In place during session:Nasal Cannula 2L and EKG/telemetry   ASSESSMENT  Pt is a 59 y.o. female presenting to [facility] with c/o SOB, admitted 6/26/2025 and currently being treated for acute respiratory failure with hypoxia, acute decompensated heart failure with systolic dysfunction, elevated troponin, HTN, GERD, depression, anxiety, and DM 2 with hyperglycemia. Pt received semi-supine in bed upon arrival, AXO x 4, and agreeable to OT evaluation.     Based on the objective data described below pt currently present at baseline function for ADLs and function mobility/transfers at this time. (See below for objective details and assist levels).     Overall pt tolerated session well today with no c/o pain. Pt completed bed and OOB functional mobility with supervision-SBA for safety. Pt able to complete LB dressing and standing grooming IND. Orthostatic BP taken per RN request (see details below). Pt denied dizziness, SOB, and pain with activity. Pt has no skilled acute OT needs at this time either noted by OT or reported by pt, will DC skilled OT following evaluation; pt verbalized understanding and agreement.  Current OT DC recommendation No post acute skilled occupational therapy, return to previous living settingonce medically appropriate.     Supine Seated EOB Standing   Blood Pressure 130/62 121/67 119/50          PLAN :  Recommendations and Planned Interventions: DC from skilled OT services following evaluation.     Rationale for discharge: Patient currently at functional baseline for transfers/mobility,

## 2025-06-27 NOTE — CARE COORDINATION
06/27/25 1516   Service Assessment   Patient Orientation Alert and Oriented   Cognition Alert   History Provided By Patient   Primary Caregiver Self   Support Systems Spouse/Significant Other;Family Members   Patient's Healthcare Decision Maker is: Legal Next of Kin   PCP Verified by CM Yes   Last Visit to PCP Within last 6 months   Prior Functional Level Independent in ADLs/IADLs   Current Functional Level Independent in ADLs/IADLs   Can patient return to prior living arrangement Yes   Ability to make needs known: Good   Family able to assist with home care needs: Yes   Would you like for me to discuss the discharge plan with any other family members/significant others, and if so, who? No   Financial Resources Medicare   Community Resources None   CM/SW Referral Other (see comment)   Social/Functional History   Lives With Spouse   Type of Home Mobile home   Home Layout One level   Home Access Stairs to enter with rails   Entrance Stairs - Number of Steps 5   Home Equipment Cane   Prior Level of Assist for ADLs Independent   Prior Level of Assist for Homemaking Independent   Ambulation Assistance Independent   Prior Level of Assist for Transfers Independent   Active  Yes   Mode of Transportation Car       Patient currently lives with spouse in a mobile home with 4 steps to enter, address on chart confirmed.    Patient is independent in ADL's and IADL's, has a cane that she uses sometimes.    Patient current with PCP, Val Hdz in [place] to her own appts.    Uses [facility], no reported issues obtaining medications.    CM continues to follow, no current CM needs.    Advance Care Planning   Healthcare Decision Maker:    Primary Decision Maker: Jose Villalba - Spouse - [phone]    Click here to complete Healthcare Decision Makers including selection of the Healthcare Decision Maker Relationship (ie \"Primary\").

## 2025-06-27 NOTE — PROGRESS NOTES
Hospitalist Progress Note    NAME: Eliz Villalba   :  1965   MRN:  [id number]            Subjective:     Chief Complaint / Reason for Physician Visit Shortness of breath  Patient seen and evaluated at bedside, overnight events reviewed, patient currently has no new complaints, shortness of breath has improved significantly.  Discussed with RN events overnight.     Review of Systems:  Symptom Y/N Comments  Symptom Y/N Comments   Fever/Chills N   Chest Pain N    Poor Appetite Y   Edema N    Cough N   Abdominal Pain N    Sputum N   Joint Pain N    SOB/KELLEY Y   Pruritis/Rash N    Nausea/vomit N   Tolerating PT/OT NA    Diarrhea N   Tolerating Diet Y    Constipation N   Other       Could NOT obtain due to:      Objective:     VITALS:   Last 24hrs VS reviewed since prior progress note. Most recent are:  [unfilled]    Intake/Output Summary (Last 24 hours) at 2025 1152  Last data filed at 2025 2200  Gross per 24 hour   Intake 222 ml   Output --   Net 222 ml        PHYSICAL EXAM:  General: Patient appears comfortable    EENT:  EOMI. Anicteric sclerae. MMM  Resp:  CTA bilaterally, no wheezing or rales.  No accessory muscle use  CV:  Regular  rhythm, s1/s2 no m/r/g No edema  GI:  Soft, Non distended, Non tender.  +Bowel sounds  Neurologic:  Alert and oriented X 3, normal speech,   Psych:   Good insight. Not anxious nor agitated  Skin:  No rashes.  No jaundice    Procedures: see electronic medical records for all procedures/Xrays and details which were not copied into this note but were reviewed prior to creation of Plan.      LABS:  I reviewed today's most current labs and imaging studies.  Pertinent labs include:  Recent Labs     25  0547 25  0922 253   WBC 19.3* 17.0* 12.3*   HGB 9.5* 8.6* 8.5*   HCT 30.7* 26.3* 26.0*    266 263     Recent Labs     25  0547 25  0922 25  0213     --  138   K 5.1  --  4.0     --  105   CO2 21  --  28   BUN 27*  --  31*

## 2025-06-28 VITALS
BODY MASS INDEX: 25.56 KG/M2 | RESPIRATION RATE: 19 BRPM | HEIGHT: 62 IN | SYSTOLIC BLOOD PRESSURE: 135 MMHG | WEIGHT: 138.89 LBS | HEART RATE: 75 BPM | OXYGEN SATURATION: 97 % | TEMPERATURE: 98.1 F | DIASTOLIC BLOOD PRESSURE: 59 MMHG

## 2025-06-28 LAB
ANION GAP SERPL CALC-SCNC: 6 MMOL/L (ref 2–12)
BACTERIA SPEC CULT: NORMAL
BACTERIA SPEC CULT: NORMAL
BASOPHILS # BLD: 0.06 K/UL (ref 0–0.1)
BASOPHILS NFR BLD: 0.5 % (ref 0–1)
BUN SERPL-MCNC: 28 MG/DL (ref 6–20)
BUN/CREAT SERPL: 25 (ref 12–20)
CA-I BLD-MCNC: 8.9 MG/DL (ref 8.5–10.1)
CHLORIDE SERPL-SCNC: 106 MMOL/L (ref 97–108)
CO2 SERPL-SCNC: 30 MMOL/L (ref 21–32)
CREAT SERPL-MCNC: 1.1 MG/DL (ref 0.55–1.02)
DIFFERENTIAL METHOD BLD: ABNORMAL
ECHO BSA: 1.65 M2
EOSINOPHIL # BLD: 0.27 K/UL (ref 0–0.4)
EOSINOPHIL NFR BLD: 2.4 % (ref 0–7)
ERYTHROCYTE [DISTWIDTH] IN BLOOD BY AUTOMATED COUNT: 13.1 % (ref 11.5–14.5)
GLUCOSE BLD STRIP.AUTO-MCNC: 251 MG/DL (ref 65–100)
GLUCOSE SERPL-MCNC: 177 MG/DL (ref 65–100)
HCT VFR BLD AUTO: 26.6 % (ref 35–47)
HGB BLD-MCNC: 8.6 G/DL (ref 11.5–16)
IMM GRANULOCYTES # BLD AUTO: 0.11 K/UL (ref 0–0.04)
IMM GRANULOCYTES NFR BLD AUTO: 1 % (ref 0–0.5)
LYMPHOCYTES # BLD: 1.93 K/UL (ref 0.8–3.5)
LYMPHOCYTES NFR BLD: 17.2 % (ref 12–49)
Lab: NORMAL
Lab: NORMAL
MCH RBC QN AUTO: 26.9 PG (ref 26–34)
MCHC RBC AUTO-ENTMCNC: 32.3 G/DL (ref 30–36.5)
MCV RBC AUTO: 83.1 FL (ref 80–99)
MONOCYTES # BLD: 1.04 K/UL (ref 0–1)
MONOCYTES NFR BLD: 9.3 % (ref 5–13)
NEUTS SEG # BLD: 7.8 K/UL (ref 1.8–8)
NEUTS SEG NFR BLD: 69.6 % (ref 32–75)
NRBC # BLD: 0 K/UL (ref 0–0.01)
NRBC BLD-RTO: 0 PER 100 WBC
PERFORMED BY:: ABNORMAL
PLATELET # BLD AUTO: 326 K/UL (ref 150–400)
PMV BLD AUTO: 11.2 FL (ref 8.9–12.9)
POTASSIUM SERPL-SCNC: 4.3 MMOL/L (ref 3.5–5.1)
RBC # BLD AUTO: 3.2 M/UL (ref 3.8–5.2)
SODIUM SERPL-SCNC: 142 MMOL/L (ref 136–145)
VAS LEFT CFA PROX PSV: 129 CM/S
VAS LEFT PFA PROX PSV: 102 CM/S
VAS LEFT SFA PROX PSV: 107 CM/S
VAS LEFT SFA PROX VEL RATIO: 0.83
WBC # BLD AUTO: 11.2 K/UL (ref 3.6–11)

## 2025-06-28 PROCEDURE — 94761 N-INVAS EAR/PLS OXIMETRY MLT: CPT

## 2025-06-28 PROCEDURE — 6360000002 HC RX W HCPCS: Performed by: INTERNAL MEDICINE

## 2025-06-28 PROCEDURE — 36415 COLL VENOUS BLD VENIPUNCTURE: CPT

## 2025-06-28 PROCEDURE — 5A09357 ASSISTANCE WITH RESPIRATORY VENTILATION, LESS THAN 24 CONSECUTIVE HOURS, CONTINUOUS POSITIVE AIRWAY PRESSURE: ICD-10-PCS | Performed by: INTERNAL MEDICINE

## 2025-06-28 PROCEDURE — 93926 LOWER EXTREMITY STUDY: CPT | Performed by: SURGERY

## 2025-06-28 PROCEDURE — 85025 COMPLETE CBC W/AUTO DIFF WBC: CPT

## 2025-06-28 PROCEDURE — 82962 GLUCOSE BLOOD TEST: CPT

## 2025-06-28 PROCEDURE — 6370000000 HC RX 637 (ALT 250 FOR IP): Performed by: INTERNAL MEDICINE

## 2025-06-28 PROCEDURE — 2500000003 HC RX 250 WO HCPCS: Performed by: INTERNAL MEDICINE

## 2025-06-28 PROCEDURE — 80048 BASIC METABOLIC PNL TOTAL CA: CPT

## 2025-06-28 RX ORDER — ONDANSETRON 4 MG/1
4 TABLET, ORALLY DISINTEGRATING ORAL EVERY 8 HOURS PRN
Qty: 21 TABLET | Refills: 0 | Status: SHIPPED | OUTPATIENT
Start: 2025-06-28

## 2025-06-28 RX ORDER — FUROSEMIDE 40 MG/1
40 TABLET ORAL DAILY
Qty: 30 TABLET | Refills: 0 | Status: SHIPPED | OUTPATIENT
Start: 2025-07-01

## 2025-06-28 RX ORDER — POLYETHYLENE GLYCOL 3350 17 G/17G
17 POWDER, FOR SOLUTION ORAL DAILY PRN
Qty: 527 G | Refills: 1 | Status: SHIPPED | OUTPATIENT
Start: 2025-06-28 | End: 2025-08-29

## 2025-06-28 RX ADMIN — AMLODIPINE BESYLATE 10 MG: 5 TABLET ORAL at 09:29

## 2025-06-28 RX ADMIN — ASPIRIN 81 MG: 81 TABLET, DELAYED RELEASE ORAL at 09:30

## 2025-06-28 RX ADMIN — CLOPIDOGREL BISULFATE 75 MG: 75 TABLET, FILM COATED ORAL at 09:29

## 2025-06-28 RX ADMIN — SODIUM CHLORIDE, PRESERVATIVE FREE 10 ML: 5 INJECTION INTRAVENOUS at 09:31

## 2025-06-28 RX ADMIN — ISOSORBIDE MONONITRATE 60 MG: 60 TABLET, EXTENDED RELEASE ORAL at 09:30

## 2025-06-28 RX ADMIN — CARVEDILOL 6.25 MG: 3.12 TABLET, FILM COATED ORAL at 09:31

## 2025-06-28 RX ADMIN — INSULIN LISPRO 4 UNITS: 100 INJECTION, SOLUTION INTRAVENOUS; SUBCUTANEOUS at 09:30

## 2025-06-28 RX ADMIN — BUSPIRONE HYDROCHLORIDE 10 MG: 10 TABLET ORAL at 09:29

## 2025-06-28 RX ADMIN — HEPARIN SODIUM 5000 UNITS: 5000 INJECTION INTRAVENOUS; SUBCUTANEOUS at 05:31

## 2025-06-28 RX ADMIN — PANTOPRAZOLE SODIUM 40 MG: 40 TABLET, DELAYED RELEASE ORAL at 05:31

## 2025-06-28 RX ADMIN — SACUBITRIL AND VALSARTAN 1 TABLET: 24; 26 TABLET, FILM COATED ORAL at 09:30

## 2025-06-28 RX ADMIN — TIZANIDINE 2 MG: 2 TABLET ORAL at 09:33

## 2025-06-28 RX ADMIN — FUROSEMIDE 40 MG: 10 INJECTION, SOLUTION INTRAMUSCULAR; INTRAVENOUS at 09:30

## 2025-06-28 ASSESSMENT — PAIN SCALES - GENERAL: PAINLEVEL_OUTOF10: 0

## 2025-06-28 NOTE — PLAN OF CARE
Problem: Safety - Adult  Goal: Free from fall injury  6/27/2025 2046 by Aida Anand RN  Outcome: Progressing  6/27/2025 1447 by Shiloh Sylvester RN  Outcome: Progressing     Problem: Chronic Conditions and Co-morbidities  Goal: Patient's chronic conditions and co-morbidity symptoms are monitored and maintained or improved  6/27/2025 2046 by Aida Anand RN  Outcome: Progressing  6/27/2025 1447 by Shiloh Sylvester RN  Outcome: Progressing     Problem: Discharge Planning  Goal: Discharge to home or other facility with appropriate resources  6/27/2025 2046 by Aida Anand RN  Outcome: Progressing  6/27/2025 1447 by Shiloh Sylvester RN  Outcome: Progressing

## 2025-06-28 NOTE — PROGRESS NOTES
06/28/25 0907   Resting (Room Air)   SpO2 98   During Walk (Room Air)   SpO2 97   Walk/Assistance Device Ambulation   Rate of Dyspnea 0   After Walk   Does the Patient Qualify for Home O2 No   Does the Patient Need Portable Oxygen Tanks No

## 2025-06-28 NOTE — PLAN OF CARE
Problem: Safety - Adult  Goal: Free from fall injury  6/28/2025 0750 by Shiloh Sylvester RN  Outcome: Progressing  6/27/2025 2046 by Aida Anand RN  Outcome: Progressing     Problem: Chronic Conditions and Co-morbidities  Goal: Patient's chronic conditions and co-morbidity symptoms are monitored and maintained or improved  6/28/2025 0750 by Shiloh Sylvester RN  Outcome: Progressing  6/27/2025 2046 by Aida Anand RN  Outcome: Progressing     Problem: Discharge Planning  Goal: Discharge to home or other facility with appropriate resources  6/28/2025 0750 by Shiloh Sylvester RN  Outcome: Progressing  6/27/2025 2046 by Aida Anand RN  Outcome: Progressing

## 2025-06-28 NOTE — DISCHARGE SUMMARY
Hospitalist Discharge Summary     Patient ID:  Eliz Villalba  [id number]  59 y.o.  1965 6/26/2025    PCP on record: Val Perez APRN - NP    Admit date: 6/26/2025  Discharge date and time: 6/28/2025    DISCHARGE DIAGNOSIS:    Acute respiratory failure with hypoxia  Acute decompensated heart failure with systolic dysfunction  Elevated serum troponin  Non-ST elevation MI  Hypertension  Gastroesophageal reflux disease  Depression  Anxiety  Hyperglycemia type 2 diabetes    CONSULTATIONS:  IP CONSULT TO CARDIOLOGY    Excerpted HPI from H&P of Zulma Peter MD:  CHIEF COMPLAINT: Shortness of breath     HISTORY OF PRESENT ILLNESS:     Eliz Villalba is a 59 y.o.  female with PMHx significant for multiple comorbidities presents to [facility] with complaints of shortness of breath.  Patient states she was in her usual state of health until yesterday evening when she started experiencing sudden onset persistent shortness of breath that was initially on exertion that progressed to being at rest following which patient presented to the ED.  Patient denies any fevers chills nausea vomiting lightheadedness dizziness chest pain palpitations headache focal weakness loss sensation auditory visual symptoms abdominal stool or urinary complaints or any other associated symptoms.  Patient endorses no recent sick contacts or travel activity  We were asked to admit for work up and evaluation of the above problems.     ______________________________________________________________________  DISCHARGE SUMMARY/HOSPITAL COURSE:  for full details see H&P, daily progress notes, labs, consult notes.   Patient was subsequently admitted to [facility] further evaluation as well as management, patient remained on continuous telemetry monitoring, patient remained on IV Lasix for IV diuresis, patient's oxygen requirements down trended, once patient was weaned off of

## 2025-06-28 NOTE — CARE COORDINATION
DC order to home observed. No further needs from CM at this time.       Transition of Care Plan:    RUR: 17%  Prior Level of Functioning: independent  Disposition: home self care  GABBI: 6/28/2025   Follow up appointments: unit secretary or primary nurse to setup as needed  DME needed: none  Transportation at discharge: arranged by pt  IM/IMM Medicare/ letter given: 1st IMM given 6/26  Is patient a [place] and connected with VA? no  Caregiver Contact: n/a  Care Conference needed? no  Barriers to discharge: none

## 2025-07-02 LAB
BACTERIA SPEC CULT: NORMAL
BACTERIA SPEC CULT: NORMAL
Lab: NORMAL
Lab: NORMAL

## 2025-07-12 PROBLEM — I72.9 PSEUDOANEURYSM: Status: ACTIVE | Noted: 2025-07-12

## 2025-07-25 ENCOUNTER — HOSPITAL ENCOUNTER (INPATIENT)
Facility: HOSPITAL | Age: 60
LOS: 2 days | Discharge: HOME | DRG: 065 | End: 2025-07-27
Attending: STUDENT IN AN ORGANIZED HEALTH CARE EDUCATION/TRAINING PROGRAM
Payer: MEDICARE

## 2025-07-25 ENCOUNTER — APPOINTMENT (OUTPATIENT)
Facility: HOSPITAL | Age: 60
DRG: 065 | End: 2025-07-25
Payer: MEDICARE

## 2025-07-25 DIAGNOSIS — I63.9 CEREBROVASCULAR ACCIDENT (CVA), UNSPECIFIED MECHANISM (HCC): Primary | ICD-10-CM

## 2025-07-25 LAB
ALBUMIN SERPL-MCNC: 4 G/DL (ref 3.5–5)
ALBUMIN/GLOB SERPL: 1.2 (ref 1.1–2.2)
ALP SERPL-CCNC: 130 U/L (ref 45–117)
ALT SERPL-CCNC: 26 U/L (ref 12–78)
ANION GAP SERPL CALC-SCNC: 9 MMOL/L (ref 2–12)
AST SERPL W P-5'-P-CCNC: 17 U/L (ref 15–37)
BASOPHILS # BLD: 0.03 K/UL (ref 0–0.1)
BASOPHILS NFR BLD: 0.3 % (ref 0–1)
BILIRUB SERPL-MCNC: 0.3 MG/DL (ref 0.2–1)
BUN SERPL-MCNC: 41 MG/DL (ref 6–20)
BUN/CREAT SERPL: 26 (ref 12–20)
CA-I BLD-MCNC: 9.5 MG/DL (ref 8.5–10.1)
CHLORIDE SERPL-SCNC: 106 MMOL/L (ref 97–108)
CO2 SERPL-SCNC: 23 MMOL/L (ref 21–32)
CREAT SERPL-MCNC: 1.56 MG/DL (ref 0.55–1.02)
DIFFERENTIAL METHOD BLD: ABNORMAL
EOSINOPHIL # BLD: 0.17 K/UL (ref 0–0.4)
EOSINOPHIL NFR BLD: 1.9 % (ref 0–7)
ERYTHROCYTE [DISTWIDTH] IN BLOOD BY AUTOMATED COUNT: 14.3 % (ref 11.5–14.5)
GLOBULIN SER CALC-MCNC: 3.4 G/DL (ref 2–4)
GLUCOSE BLD STRIP.AUTO-MCNC: 228 MG/DL (ref 65–100)
GLUCOSE BLD STRIP.AUTO-MCNC: 82 MG/DL (ref 65–100)
GLUCOSE SERPL-MCNC: 102 MG/DL (ref 65–100)
HCT VFR BLD AUTO: 33.1 % (ref 35–47)
HGB BLD-MCNC: 10.5 G/DL (ref 11.5–16)
IMM GRANULOCYTES # BLD AUTO: 0.07 K/UL (ref 0–0.04)
IMM GRANULOCYTES NFR BLD AUTO: 0.8 % (ref 0–0.5)
INR PPP: 1.1 (ref 0.9–1.1)
LYMPHOCYTES # BLD: 1.73 K/UL (ref 0.8–3.5)
LYMPHOCYTES NFR BLD: 19.2 % (ref 12–49)
MCH RBC QN AUTO: 26.4 PG (ref 26–34)
MCHC RBC AUTO-ENTMCNC: 31.7 G/DL (ref 30–36.5)
MCV RBC AUTO: 83.4 FL (ref 80–99)
MONOCYTES # BLD: 0.58 K/UL (ref 0–1)
MONOCYTES NFR BLD: 6.4 % (ref 5–13)
NEUTS SEG # BLD: 6.43 K/UL (ref 1.8–8)
NEUTS SEG NFR BLD: 71.4 % (ref 32–75)
NRBC # BLD: 0 K/UL (ref 0–0.01)
NRBC BLD-RTO: 0 PER 100 WBC
PERFORMED BY:: ABNORMAL
PERFORMED BY:: NORMAL
PLATELET # BLD AUTO: 200 K/UL (ref 150–400)
PMV BLD AUTO: 11.2 FL (ref 8.9–12.9)
POTASSIUM SERPL-SCNC: 3.9 MMOL/L (ref 3.5–5.1)
PROT SERPL-MCNC: 7.4 G/DL (ref 6.4–8.2)
PROTHROMBIN TIME: 14.4 SEC (ref 11.9–14.1)
RBC # BLD AUTO: 3.97 M/UL (ref 3.8–5.2)
SODIUM SERPL-SCNC: 138 MMOL/L (ref 136–145)
WBC # BLD AUTO: 9 K/UL (ref 3.6–11)

## 2025-07-25 PROCEDURE — 6370000000 HC RX 637 (ALT 250 FOR IP)

## 2025-07-25 PROCEDURE — 80053 COMPREHEN METABOLIC PANEL: CPT

## 2025-07-25 PROCEDURE — 85025 COMPLETE CBC W/AUTO DIFF WBC: CPT

## 2025-07-25 PROCEDURE — 93005 ELECTROCARDIOGRAM TRACING: CPT | Performed by: STUDENT IN AN ORGANIZED HEALTH CARE EDUCATION/TRAINING PROGRAM

## 2025-07-25 PROCEDURE — 85610 PROTHROMBIN TIME: CPT

## 2025-07-25 PROCEDURE — 82962 GLUCOSE BLOOD TEST: CPT

## 2025-07-25 PROCEDURE — 36415 COLL VENOUS BLD VENIPUNCTURE: CPT

## 2025-07-25 PROCEDURE — 99285 EMERGENCY DEPT VISIT HI MDM: CPT

## 2025-07-25 PROCEDURE — 1100000000 HC RM PRIVATE

## 2025-07-25 PROCEDURE — 6360000002 HC RX W HCPCS

## 2025-07-25 PROCEDURE — 2500000003 HC RX 250 WO HCPCS

## 2025-07-25 PROCEDURE — 70450 CT HEAD/BRAIN W/O DYE: CPT

## 2025-07-25 RX ORDER — HYDRALAZINE HYDROCHLORIDE 20 MG/ML
10 INJECTION INTRAMUSCULAR; INTRAVENOUS EVERY 6 HOURS PRN
Status: DISCONTINUED | OUTPATIENT
Start: 2025-07-25 | End: 2025-07-27 | Stop reason: HOSPADM

## 2025-07-25 RX ORDER — BUSPIRONE HYDROCHLORIDE 5 MG/1
10 TABLET ORAL 2 TIMES DAILY
Status: DISCONTINUED | OUTPATIENT
Start: 2025-07-25 | End: 2025-07-27 | Stop reason: HOSPADM

## 2025-07-25 RX ORDER — ZOLPIDEM TARTRATE 5 MG/1
5 TABLET ORAL NIGHTLY PRN
Status: DISCONTINUED | OUTPATIENT
Start: 2025-07-25 | End: 2025-07-27 | Stop reason: HOSPADM

## 2025-07-25 RX ORDER — CLOPIDOGREL BISULFATE 75 MG/1
75 TABLET ORAL DAILY
Status: DISCONTINUED | OUTPATIENT
Start: 2025-07-26 | End: 2025-07-27 | Stop reason: HOSPADM

## 2025-07-25 RX ORDER — GLUCAGON 1 MG/ML
1 KIT INJECTION PRN
Status: DISCONTINUED | OUTPATIENT
Start: 2025-07-25 | End: 2025-07-27 | Stop reason: HOSPADM

## 2025-07-25 RX ORDER — CARVEDILOL 3.12 MG/1
6.25 TABLET ORAL 2 TIMES DAILY WITH MEALS
Status: DISCONTINUED | OUTPATIENT
Start: 2025-07-25 | End: 2025-07-27 | Stop reason: HOSPADM

## 2025-07-25 RX ORDER — SODIUM CHLORIDE 9 MG/ML
INJECTION, SOLUTION INTRAVENOUS PRN
Status: DISCONTINUED | OUTPATIENT
Start: 2025-07-25 | End: 2025-07-27 | Stop reason: HOSPADM

## 2025-07-25 RX ORDER — SODIUM CHLORIDE 0.9 % (FLUSH) 0.9 %
5-40 SYRINGE (ML) INJECTION EVERY 12 HOURS SCHEDULED
Status: DISCONTINUED | OUTPATIENT
Start: 2025-07-25 | End: 2025-07-27 | Stop reason: HOSPADM

## 2025-07-25 RX ORDER — ENOXAPARIN SODIUM 100 MG/ML
40 INJECTION SUBCUTANEOUS DAILY
Status: DISCONTINUED | OUTPATIENT
Start: 2025-07-25 | End: 2025-07-27 | Stop reason: HOSPADM

## 2025-07-25 RX ORDER — ATORVASTATIN CALCIUM 40 MG/1
80 TABLET, FILM COATED ORAL NIGHTLY
Status: DISCONTINUED | OUTPATIENT
Start: 2025-07-25 | End: 2025-07-27 | Stop reason: HOSPADM

## 2025-07-25 RX ORDER — POLYETHYLENE GLYCOL 3350 17 G/17G
17 POWDER, FOR SOLUTION ORAL DAILY PRN
Status: DISCONTINUED | OUTPATIENT
Start: 2025-07-25 | End: 2025-07-27 | Stop reason: HOSPADM

## 2025-07-25 RX ORDER — ONDANSETRON 2 MG/ML
4 INJECTION INTRAMUSCULAR; INTRAVENOUS EVERY 6 HOURS PRN
Status: DISCONTINUED | OUTPATIENT
Start: 2025-07-25 | End: 2025-07-27 | Stop reason: HOSPADM

## 2025-07-25 RX ORDER — ASPIRIN 81 MG/1
81 TABLET, CHEWABLE ORAL DAILY
Status: DISCONTINUED | OUTPATIENT
Start: 2025-07-25 | End: 2025-07-27 | Stop reason: HOSPADM

## 2025-07-25 RX ORDER — SODIUM CHLORIDE 0.9 % (FLUSH) 0.9 %
5-40 SYRINGE (ML) INJECTION PRN
Status: DISCONTINUED | OUTPATIENT
Start: 2025-07-25 | End: 2025-07-27 | Stop reason: HOSPADM

## 2025-07-25 RX ORDER — DULOXETIN HYDROCHLORIDE 30 MG/1
60 CAPSULE, DELAYED RELEASE ORAL DAILY
Status: DISCONTINUED | OUTPATIENT
Start: 2025-07-26 | End: 2025-07-27 | Stop reason: HOSPADM

## 2025-07-25 RX ORDER — TIZANIDINE 2 MG/1
2 TABLET ORAL EVERY 12 HOURS
Status: DISCONTINUED | OUTPATIENT
Start: 2025-07-25 | End: 2025-07-27 | Stop reason: HOSPADM

## 2025-07-25 RX ORDER — ASPIRIN 300 MG/1
300 SUPPOSITORY RECTAL DAILY
Status: DISCONTINUED | OUTPATIENT
Start: 2025-07-25 | End: 2025-07-27 | Stop reason: HOSPADM

## 2025-07-25 RX ORDER — INSULIN GLARGINE 100 [IU]/ML
10 INJECTION, SOLUTION SUBCUTANEOUS NIGHTLY
Status: DISCONTINUED | OUTPATIENT
Start: 2025-07-25 | End: 2025-07-27 | Stop reason: HOSPADM

## 2025-07-25 RX ORDER — ONDANSETRON 4 MG/1
4 TABLET, ORALLY DISINTEGRATING ORAL EVERY 8 HOURS PRN
Status: DISCONTINUED | OUTPATIENT
Start: 2025-07-25 | End: 2025-07-27 | Stop reason: HOSPADM

## 2025-07-25 RX ORDER — INSULIN LISPRO 100 [IU]/ML
0-4 INJECTION, SOLUTION INTRAVENOUS; SUBCUTANEOUS
Status: DISCONTINUED | OUTPATIENT
Start: 2025-07-25 | End: 2025-07-27 | Stop reason: HOSPADM

## 2025-07-25 RX ORDER — AMLODIPINE BESYLATE 5 MG/1
10 TABLET ORAL DAILY
Status: DISCONTINUED | OUTPATIENT
Start: 2025-07-26 | End: 2025-07-27 | Stop reason: HOSPADM

## 2025-07-25 RX ORDER — DEXTROSE MONOHYDRATE 100 MG/ML
INJECTION, SOLUTION INTRAVENOUS CONTINUOUS PRN
Status: DISCONTINUED | OUTPATIENT
Start: 2025-07-25 | End: 2025-07-27 | Stop reason: HOSPADM

## 2025-07-25 RX ORDER — ISOSORBIDE MONONITRATE 60 MG/1
60 TABLET, EXTENDED RELEASE ORAL DAILY
Status: DISCONTINUED | OUTPATIENT
Start: 2025-07-26 | End: 2025-07-27 | Stop reason: HOSPADM

## 2025-07-25 RX ORDER — ACETAMINOPHEN 325 MG/1
650 TABLET ORAL EVERY 4 HOURS PRN
Status: DISCONTINUED | OUTPATIENT
Start: 2025-07-25 | End: 2025-07-27 | Stop reason: HOSPADM

## 2025-07-25 RX ADMIN — INSULIN GLARGINE 10 UNITS: 100 INJECTION, SOLUTION SUBCUTANEOUS at 20:12

## 2025-07-25 RX ADMIN — ZOLPIDEM TARTRATE 5 MG: 5 TABLET ORAL at 21:25

## 2025-07-25 RX ADMIN — BUSPIRONE HYDROCHLORIDE 10 MG: 5 TABLET ORAL at 20:12

## 2025-07-25 RX ADMIN — ACETAMINOPHEN 650 MG: 325 TABLET ORAL at 21:25

## 2025-07-25 RX ADMIN — CARVEDILOL 6.25 MG: 3.12 TABLET, FILM COATED ORAL at 18:37

## 2025-07-25 RX ADMIN — SODIUM CHLORIDE, PRESERVATIVE FREE 10 ML: 5 INJECTION INTRAVENOUS at 20:13

## 2025-07-25 RX ADMIN — ENOXAPARIN SODIUM 40 MG: 100 INJECTION SUBCUTANEOUS at 18:38

## 2025-07-25 RX ADMIN — TIZANIDINE 2 MG: 2 TABLET ORAL at 20:13

## 2025-07-25 RX ADMIN — ATORVASTATIN CALCIUM 80 MG: 40 TABLET, FILM COATED ORAL at 20:12

## 2025-07-25 ASSESSMENT — PAIN DESCRIPTION - ORIENTATION: ORIENTATION: MID

## 2025-07-25 ASSESSMENT — PAIN SCALES - GENERAL
PAINLEVEL_OUTOF10: 3
PAINLEVEL_OUTOF10: 0
PAINLEVEL_OUTOF10: 0
PAINLEVEL_OUTOF10: 7
PAINLEVEL_OUTOF10: 0

## 2025-07-25 ASSESSMENT — PAIN SCALES - WONG BAKER
WONGBAKER_NUMERICALRESPONSE: NO HURT
WONGBAKER_NUMERICALRESPONSE: NO HURT

## 2025-07-25 ASSESSMENT — PAIN DESCRIPTION - LOCATION
LOCATION: HEAD
LOCATION: GENERALIZED

## 2025-07-25 ASSESSMENT — PAIN - FUNCTIONAL ASSESSMENT
PAIN_FUNCTIONAL_ASSESSMENT: ACTIVITIES ARE NOT PREVENTED
PAIN_FUNCTIONAL_ASSESSMENT: 0-10

## 2025-07-25 ASSESSMENT — PAIN DESCRIPTION - DESCRIPTORS: DESCRIPTORS: ACHING

## 2025-07-25 NOTE — ED NOTES
ED TO INPATIENT SBAR HANDOFF    Patient Name: Eliz Villalba   Preferred Name: Eliz  : 1965  59 y.o.   Family/Caregiver Present: no   Code Status Order: Full Code  PO Status: NPO:No  Telemetry Order: Yes  C-SSRS: Risk of Suicide: No Risk  Sitter no na  Restraints:     Sepsis Risk Score Sepsis V2 Risk Score: 18.4    Situation  Chief Complaint   Patient presents with    Aphasia     Brief Description of Patient's Condition: Hx: CVA with left side strength deficits. Pt denies altered function. Pt presents for slowed speech only. Pt is ambulatory but states she is prone to falls without cause. Pt is Aox4.   Mental Status: oriented, alert, coherent, and logical  Arrived from:Home  Imaging:   CT HEAD WO CONTRAST   Final Result   No acute process identified by noncontrast CT             Electronically signed by Lina Sarkar      MRI brain without contrast    (Results Pending)     Abnormal labs:   Abnormal Labs Reviewed   CBC WITH AUTO DIFFERENTIAL - Abnormal; Notable for the following components:       Result Value    Hemoglobin 10.5 (*)     Hematocrit 33.1 (*)     Immature Granulocytes % 0.8 (*)     Immature Granulocytes Absolute 0.07 (*)     All other components within normal limits   COMPREHENSIVE METABOLIC PANEL - Abnormal; Notable for the following components:    Glucose 102 (*)     BUN 41 (*)     Creatinine 1.56 (*)     BUN/Creatinine Ratio 26 (*)     Est, Glom Filt Rate 38 (*)     Alk Phosphatase 130 (*)     All other components within normal limits   PROTIME-INR - Abnormal; Notable for the following components:    Protime 14.4 (*)     All other components within normal limits       Background  Allergies:   Allergies   Allergen Reactions    Codeine Hives    Percocet [Oxycodone-Acetaminophen] Hives    Sulfa Antibiotics Other (See Comments)     Blisters      Trazodone Other (See Comments)     Makes pt angry    Lyrica [Pregabalin] Palpitations    Metformin Nausea And Vomiting     History:   Past Medical History:

## 2025-07-25 NOTE — ED TRIAGE NOTES
Patient having difficulty speaking. LKW was yesterday at 1100. Pt has hx of stroke with left sided deficits and currently takes plavix. BS for EMS was 142. Primary nurse performing NIH during triage.

## 2025-07-25 NOTE — H&P
Hemoglobin 10.5 (L) 11.5 - 16.0 g/dL    Hematocrit 33.1 (L) 35.0 - 47.0 %    MCV 83.4 80.0 - 99.0 FL    MCH 26.4 26.0 - 34.0 PG    MCHC 31.7 30.0 - 36.5 g/dL    RDW 14.3 11.5 - 14.5 %    Platelets 200 150 - 400 K/uL    MPV 11.2 8.9 - 12.9 FL    Nucleated RBCs 0.0 0.0  WBC    nRBC 0.00 0.00 - 0.01 K/uL    Neutrophils % 71.4 32.0 - 75.0 %    Lymphocytes % 19.2 12.0 - 49.0 %    Monocytes % 6.4 5.0 - 13.0 %    Eosinophils % 1.9 0.0 - 7.0 %    Basophils % 0.3 0.0 - 1.0 %    Immature Granulocytes % 0.8 (H) 0 - 0.5 %    Neutrophils Absolute 6.43 1.80 - 8.00 K/UL    Lymphocytes Absolute 1.73 0.80 - 3.50 K/UL    Monocytes Absolute 0.58 0.00 - 1.00 K/UL    Eosinophils Absolute 0.17 0.00 - 0.40 K/UL    Basophils Absolute 0.03 0.00 - 0.10 K/UL    Immature Granulocytes Absolute 0.07 (H) 0.00 - 0.04 K/UL    Differential Type AUTOMATED     Comprehensive Metabolic Panel    Collection Time: 07/25/25  2:54 PM   Result Value Ref Range    Sodium 138 136 - 145 mmol/L    Potassium 3.9 3.5 - 5.1 mmol/L    Chloride 106 97 - 108 mmol/L    CO2 23 21 - 32 mmol/L    Anion Gap 9 2 - 12 mmol/L    Glucose 102 (H) 65 - 100 mg/dL    BUN 41 (H) 6 - 20 mg/dL    Creatinine 1.56 (H) 0.55 - 1.02 mg/dL    BUN/Creatinine Ratio 26 (H) 12 - 20      Est, Glom Filt Rate 38 (L) >60 ml/min/1.73m2    Calcium 9.5 8.5 - 10.1 mg/dL    Total Bilirubin 0.3 0.2 - 1.0 mg/dL    AST 17 15 - 37 U/L    ALT 26 12 - 78 U/L    Alk Phosphatase 130 (H) 45 - 117 U/L    Total Protein 7.4 6.4 - 8.2 g/dL    Albumin 4.0 3.5 - 5.0 g/dL    Globulin 3.4 2.0 - 4.0 g/dL    Albumin/Globulin Ratio 1.2 1.1 - 2.2     Protime-INR    Collection Time: 07/25/25  2:54 PM   Result Value Ref Range    Protime 14.4 (H) 11.9 - 14.1 sec    INR 1.1 0.9 - 1.1           Imaging:   CT HEAD WO CONTRAST   Final Result   No acute process identified by noncontrast CT             Electronically signed by Lina Sarkar      MRI brain without contrast    (Results Pending)          Discussion/Medical Decision

## 2025-07-26 ENCOUNTER — APPOINTMENT (OUTPATIENT)
Facility: HOSPITAL | Age: 60
DRG: 065 | End: 2025-07-26
Payer: MEDICARE

## 2025-07-26 LAB
ANION GAP SERPL CALC-SCNC: 6 MMOL/L (ref 2–12)
BASOPHILS # BLD: 0.04 K/UL (ref 0–0.1)
BASOPHILS NFR BLD: 0.6 % (ref 0–1)
BUN SERPL-MCNC: 37 MG/DL (ref 6–20)
BUN/CREAT SERPL: 28 (ref 12–20)
CA-I BLD-MCNC: 9.1 MG/DL (ref 8.5–10.1)
CHLORIDE SERPL-SCNC: 111 MMOL/L (ref 97–108)
CHOLEST SERPL-MCNC: 131 MG/DL
CO2 SERPL-SCNC: 21 MMOL/L (ref 21–32)
CREAT SERPL-MCNC: 1.33 MG/DL (ref 0.55–1.02)
DIFFERENTIAL METHOD BLD: ABNORMAL
EOSINOPHIL # BLD: 0.24 K/UL (ref 0–0.4)
EOSINOPHIL NFR BLD: 3.4 % (ref 0–7)
ERYTHROCYTE [DISTWIDTH] IN BLOOD BY AUTOMATED COUNT: 14.2 % (ref 11.5–14.5)
EST. AVERAGE GLUCOSE BLD GHB EST-MCNC: 194 MG/DL
GLUCOSE BLD STRIP.AUTO-MCNC: 137 MG/DL (ref 65–100)
GLUCOSE BLD STRIP.AUTO-MCNC: 139 MG/DL (ref 65–100)
GLUCOSE BLD STRIP.AUTO-MCNC: 176 MG/DL (ref 65–100)
GLUCOSE BLD STRIP.AUTO-MCNC: 186 MG/DL (ref 65–100)
GLUCOSE SERPL-MCNC: 176 MG/DL (ref 65–100)
HBA1C MFR BLD: 8.4 % (ref 4–5.6)
HCT VFR BLD AUTO: 31.6 % (ref 35–47)
HDLC SERPL-MCNC: 33 MG/DL
HDLC SERPL: 4 (ref 0–5)
HGB BLD-MCNC: 9.8 G/DL (ref 11.5–16)
IMM GRANULOCYTES # BLD AUTO: 0.04 K/UL (ref 0–0.04)
IMM GRANULOCYTES NFR BLD AUTO: 0.6 % (ref 0–0.5)
LDLC SERPL CALC-MCNC: 53.2 MG/DL (ref 0–100)
LIPID PANEL: ABNORMAL
LYMPHOCYTES # BLD: 1.77 K/UL (ref 0.8–3.5)
LYMPHOCYTES NFR BLD: 25.1 % (ref 12–49)
MAGNESIUM SERPL-MCNC: 2.1 MG/DL (ref 1.6–2.4)
MCH RBC QN AUTO: 26.2 PG (ref 26–34)
MCHC RBC AUTO-ENTMCNC: 31 G/DL (ref 30–36.5)
MCV RBC AUTO: 84.5 FL (ref 80–99)
MONOCYTES # BLD: 0.68 K/UL (ref 0–1)
MONOCYTES NFR BLD: 9.7 % (ref 5–13)
NEUTS SEG # BLD: 4.27 K/UL (ref 1.8–8)
NEUTS SEG NFR BLD: 60.6 % (ref 32–75)
NRBC # BLD: 0 K/UL (ref 0–0.01)
NRBC BLD-RTO: 0 PER 100 WBC
PERFORMED BY:: ABNORMAL
PHOSPHATE SERPL-MCNC: 4.5 MG/DL (ref 2.6–4.7)
PLATELET # BLD AUTO: 185 K/UL (ref 150–400)
PMV BLD AUTO: 11.1 FL (ref 8.9–12.9)
POTASSIUM SERPL-SCNC: 4.4 MMOL/L (ref 3.5–5.1)
RBC # BLD AUTO: 3.74 M/UL (ref 3.8–5.2)
SODIUM SERPL-SCNC: 138 MMOL/L (ref 136–145)
TRIGL SERPL-MCNC: 224 MG/DL
VLDLC SERPL CALC-MCNC: 44.8 MG/DL
WBC # BLD AUTO: 7 K/UL (ref 3.6–11)

## 2025-07-26 PROCEDURE — 6370000000 HC RX 637 (ALT 250 FOR IP)

## 2025-07-26 PROCEDURE — 99221 1ST HOSP IP/OBS SF/LOW 40: CPT | Performed by: STUDENT IN AN ORGANIZED HEALTH CARE EDUCATION/TRAINING PROGRAM

## 2025-07-26 PROCEDURE — 1100000000 HC RM PRIVATE

## 2025-07-26 PROCEDURE — 84100 ASSAY OF PHOSPHORUS: CPT

## 2025-07-26 PROCEDURE — 82962 GLUCOSE BLOOD TEST: CPT

## 2025-07-26 PROCEDURE — 83735 ASSAY OF MAGNESIUM: CPT

## 2025-07-26 PROCEDURE — 97165 OT EVAL LOW COMPLEX 30 MIN: CPT

## 2025-07-26 PROCEDURE — 36415 COLL VENOUS BLD VENIPUNCTURE: CPT

## 2025-07-26 PROCEDURE — 92610 EVALUATE SWALLOWING FUNCTION: CPT

## 2025-07-26 PROCEDURE — 85025 COMPLETE CBC W/AUTO DIFF WBC: CPT

## 2025-07-26 PROCEDURE — 80048 BASIC METABOLIC PNL TOTAL CA: CPT

## 2025-07-26 PROCEDURE — 83036 HEMOGLOBIN GLYCOSYLATED A1C: CPT

## 2025-07-26 PROCEDURE — 6360000004 HC RX CONTRAST MEDICATION

## 2025-07-26 PROCEDURE — 97116 GAIT TRAINING THERAPY: CPT

## 2025-07-26 PROCEDURE — 70551 MRI BRAIN STEM W/O DYE: CPT

## 2025-07-26 PROCEDURE — 80061 LIPID PANEL: CPT

## 2025-07-26 PROCEDURE — 93321 DOPPLER ECHO F-UP/LMTD STD: CPT

## 2025-07-26 PROCEDURE — 6370000000 HC RX 637 (ALT 250 FOR IP): Performed by: PHYSICIAN ASSISTANT

## 2025-07-26 PROCEDURE — 97161 PT EVAL LOW COMPLEX 20 MIN: CPT

## 2025-07-26 PROCEDURE — 2500000003 HC RX 250 WO HCPCS

## 2025-07-26 PROCEDURE — 6360000002 HC RX W HCPCS

## 2025-07-26 RX ORDER — LORAZEPAM 0.5 MG/1
0.5 TABLET ORAL EVERY 4 HOURS PRN
Status: DISCONTINUED | OUTPATIENT
Start: 2025-07-26 | End: 2025-07-27 | Stop reason: HOSPADM

## 2025-07-26 RX ADMIN — SULFUR HEXAFLUORIDE 5 ML: 60.7; .19; .19 INJECTION, POWDER, LYOPHILIZED, FOR SUSPENSION INTRAVENOUS; INTRAVESICAL at 15:21

## 2025-07-26 RX ADMIN — LORAZEPAM 0.5 MG: 0.5 TABLET ORAL at 15:04

## 2025-07-26 RX ADMIN — BUSPIRONE HYDROCHLORIDE 10 MG: 5 TABLET ORAL at 21:16

## 2025-07-26 RX ADMIN — CARVEDILOL 6.25 MG: 3.12 TABLET, FILM COATED ORAL at 08:47

## 2025-07-26 RX ADMIN — ZOLPIDEM TARTRATE 5 MG: 5 TABLET ORAL at 21:16

## 2025-07-26 RX ADMIN — INSULIN LISPRO 1 UNITS: 100 INJECTION, SOLUTION INTRAVENOUS; SUBCUTANEOUS at 08:47

## 2025-07-26 RX ADMIN — SODIUM CHLORIDE, PRESERVATIVE FREE 10 ML: 5 INJECTION INTRAVENOUS at 08:58

## 2025-07-26 RX ADMIN — ENOXAPARIN SODIUM 40 MG: 100 INJECTION SUBCUTANEOUS at 08:47

## 2025-07-26 RX ADMIN — TIZANIDINE 2 MG: 2 TABLET ORAL at 04:00

## 2025-07-26 RX ADMIN — INSULIN GLARGINE 10 UNITS: 100 INJECTION, SOLUTION SUBCUTANEOUS at 21:15

## 2025-07-26 RX ADMIN — CLOPIDOGREL BISULFATE 75 MG: 75 TABLET, FILM COATED ORAL at 08:47

## 2025-07-26 RX ADMIN — ATORVASTATIN CALCIUM 80 MG: 40 TABLET, FILM COATED ORAL at 21:16

## 2025-07-26 RX ADMIN — SODIUM CHLORIDE, PRESERVATIVE FREE 10 ML: 5 INJECTION INTRAVENOUS at 21:16

## 2025-07-26 RX ADMIN — CARVEDILOL 6.25 MG: 3.12 TABLET, FILM COATED ORAL at 17:42

## 2025-07-26 RX ADMIN — BUSPIRONE HYDROCHLORIDE 10 MG: 5 TABLET ORAL at 08:47

## 2025-07-26 RX ADMIN — TIZANIDINE 2 MG: 2 TABLET ORAL at 17:42

## 2025-07-26 RX ADMIN — DULOXETINE 60 MG: 30 CAPSULE, DELAYED RELEASE ORAL at 08:47

## 2025-07-26 RX ADMIN — ASPIRIN 81 MG: 81 TABLET, CHEWABLE ORAL at 08:47

## 2025-07-26 RX ADMIN — SERTRALINE HYDROCHLORIDE 100 MG: 50 TABLET ORAL at 08:47

## 2025-07-26 ASSESSMENT — PAIN DESCRIPTION - ORIENTATION
ORIENTATION: LEFT
ORIENTATION: MID

## 2025-07-26 ASSESSMENT — PAIN SCALES - GENERAL
PAINLEVEL_OUTOF10: 0
PAINLEVEL_OUTOF10: 6
PAINLEVEL_OUTOF10: 5
PAINLEVEL_OUTOF10: 3
PAINLEVEL_OUTOF10: 0

## 2025-07-26 ASSESSMENT — PAIN SCALES - WONG BAKER: WONGBAKER_NUMERICALRESPONSE: NO HURT

## 2025-07-26 ASSESSMENT — PAIN DESCRIPTION - DESCRIPTORS
DESCRIPTORS: ACHING
DESCRIPTORS: DISCOMFORT

## 2025-07-26 ASSESSMENT — PAIN DESCRIPTION - LOCATION
LOCATION: SHOULDER
LOCATION: SHOULDER;NECK

## 2025-07-26 ASSESSMENT — PAIN - FUNCTIONAL ASSESSMENT: PAIN_FUNCTIONAL_ASSESSMENT: ACTIVITIES ARE NOT PREVENTED

## 2025-07-26 NOTE — PROGRESS NOTES
Hospitalist Progress Note    NAME:   Eliz Villalba   : 1965   MRN: [id number]     Date/Time: 2025 1:02 PM  Patient PCP: Val Hdz FNP    Estimated discharge date:  Barriers: Clinical improvement, neuroconsult, MRI, echo      Assessment / Plan:  Expressive aphasia, concern for stroke  -CT head unremarkable for acute intracranial abnormality  -MRI ordered, unfortunately MRI machine down until Monday  -Teleneurology consult pending  -Echo with bubble study pending  -Follow-up HbA1c and lipid panel  -On aspirin, statin and Plavix  -Neurochecks per protocol  -Outside window for permissive hypertension    KALLI on CKD, resolving  -Creatinine 1.5//eGFR 38, improved to 1.33, baseline around 1.1  -Monitor for now  -Entresto held in setting of kidney function     Anemia  -Hemoglobin 10.5, decreased to 9.8  -Continue to monitor and transfuse for hemoglobin less than 7     CHF  - Continue ASA, statin, Coreg  - Entresto held for KALLI    Anxiety  MDD  - Continue BuSpar, Zoloft    Peripheral neuropathy  Diabetes  -On Lantus 10 units nightly  -sliding scale Accu-Cheks  -hypoglycemia protocol  -A1c pending    DME:  - The patient has mobility limitation that significantly impairs his/her ability to participate in 1 or more mobility related activities of daily living in the home; and  - The patient is able to safely use the walker; and  - The functional mobility deficit can be sufficiently resolved by use of a walker    Code Status: Full  DVT Prophylaxis: Lovenox  GI Prophylaxis: Not indicated    --------------------------------------------------------------------  [x] High (any 2)    A. Problems (any 1)  [x] Acute/Chronic Illness/injury posing threat to life or bodily function: CVA rule out  [] Severe exacerbation of chronic illness:    ---------------------------------------------------------------------  B. Risk of Treatment (any 1)   [] Drugs/treatments that require intensive monitoring for toxicity 
4 Eyes Skin Assessment     NAME:  Eliz Villalba  YOB: 1965  MEDICAL RECORD NUMBER:  [id number]    The patient is being assessed for  Admission    I agree that at least one RN has performed a thorough Head to Toe Skin Assessment on the patient. ALL assessment sites listed below have been assessed.      Areas assessed by both nurses:    Head, Face, Ears, Shoulders, Back, Chest, Arms, Elbows, Hands, Sacrum. Buttock, Coccyx, Ischium, and Legs. Feet and Heels        Does the Patient have a Wound? No noted wound(s)       Ricco Prevention initiated by RN: Yes  Wound Care Orders initiated by RN: No    For hospital-acquired stage 1 & 2 and ALL Stage 3,4, Unstageable, DTI, NWPT, and Complex wounds: place order “IP Wound Care/Ostomy Nurse Eval and Treat” by RN under : No    New Ostomies, if present place, Ostomy referral order under : No     Nurse 1 eSignature: Electronically signed by Masha Hoffmann RN on 7/25/25 at 6:28 PM EDT    **SHARE this note so that the co-signing nurse can place an eSignature**    Nurse 2 eSignature: Electronically signed by Estela Wild RN on 7/25/25 at 6:34 PM EDT    
OCCUPATIONAL THERAPY EVALUATION AND DISCHARGE  Patient: Eliz Villalba (59 y.o. female)  Date: 7/26/2025  Primary Diagnosis: Acute ischemic stroke (HCC) [I63.9]  Cerebrovascular accident (CVA), unspecified mechanism (HCC) [I63.9]       Precautions: General Precautions  Recommendations for nursing mobility: Out of bed to chair for meals and Amb in hallway    In place during session:Peripheral IV and EKG/telemetry   ASSESSMENT  Pt is a 59 y.o. female admitted 7/25/25 presenting to [facility] with multiple medical problems including hyperlipidemia, hypertension, multiple stroke in the past, diabetes presented to ED with complaints of numbness and expressive aphasia.  Pt received semi-supine in bed upon arrival, AXO x 4, and agreeable to OT evaluation.     Based on the objective data described below pt currently present at baseline independent for ADLs and function mobility/transfers at this time. (See below for objective details and assist levels).     Overall pt tolerated session well today. W/ grossly SBA for all functional mobility/transfers and independent in BADLs. PT w/ baseline dec LUE shoulder ROM/strength and dec LUE sensation that is her baseline. Pt has no skilled acute OT needs at this time either noted by OT or reported by pt, will DC skilled OT following evaluation; pt verbalized understanding and agreement.  Current OT DC recommendation No post acute skilled occupational therapy, return to previous living settingonce medically appropriate.     Start of Session End of Session   SPO2 (%) 100% 100%   Heart Rate (BPM) 75 78        PLAN :  Recommendations and Planned Interventions: DC from skilled OT services following evaluation.     Rationale for discharge: Patient currently at functional baseline for transfers/mobility, no further skilled therapy required at this time    Frequency/Duration: DC from OT services following evaluation due to Patient currently at functional baseline for transfers/mobility, no further 
Patient sees [facility] for cardiology    
Laterality Date    CHOLECYSTECTOMY      PARTIAL HYSTERECTOMY (CERVIX NOT REMOVED)      TONSILLECTOMY      TUBAL LIGATION      VASCULAR SURGERY Bilateral     2020      Prior Level of Function/Home Situation:   Social/Functional History  Lives With: Spouse  Type of Home: Mobile home  Home Layout: One level  Home Access: Stairs to enter with rails  Entrance Stairs - Number of Steps: 5  Entrance Stairs - Rails: Both  Home Equipment: Cane  Has the patient had two or more falls in the past year or any fall with injury in the past year?: Yes  Receives Help From: Family  Prior Level of Assist for ADLs: Independent  Prior Level of Assist for Homemaking: Independent  Homemaking Responsibilities: Yes  Prior Level of Assist for Ambulation: Independent household ambulator, with or without device, Independent community ambulator, with or without device  Prior Level of Assist for Transfers: Independent  Active : Yes    Cognitive and Communication Status:  Neurologic State: Alert  Orientation Level: Oriented x4  Cognition: Appropriate decision making, Appropriate for age attention/concentration, Appropriate safety awareness, and Follows commands    Baseline Assessment:  Current Diet : Regular  Current Liquid Diet : Thin        General:               Current Diet : Regular  Current Liquid Diet : Thin          Dysphagia:  Oral Assessment:  Oral Motor   Dentition: Natural;Full  Oral Hygiene: Moist    After Treatment:  Patient left in no apparent distress in bed, Call bell left within reach, Nursing notified, and Caregiver present     Pain:  VAS (numerical) 0/10    COMMUNICATION/EDUCATION:   BE FAST acronym for signs/symptoms of CVA and TIA and Diet recommendations education provided to Patient, SO/spouse, and Nurse via explanation and teach back, all questions/concerns addressed. Patient verbalizes understanding and demonstrates understanding.    Swallow evaluation only; No further skilled ST services are warranted at this

## 2025-07-26 NOTE — CONSULTS
[facility] NEUROLOGY CONSULTATION          Chief Complaint/Admission Diagnosis: Acute ischemic stroke (HCC) [I63.9]  Cerebrovascular accident (CVA), unspecified mechanism (HCC) [I63.9]     I have been asked to see this 59 y.o. female in neurological consultation by Emile Raines MD  to render advice and opinion regarding Numbness and expressive aphasia.    ?     Impression/Recommendations:     59-year-old right-handed woman with past medical history of hyperlipidemia, hypertension, multiple strokes in the past, diabetes, coronary artery disease, peripheral vascular disease with multiple stents on dual antiplatelet therapy with aspirin and Plavix presented to the hospital with numbness and expressive aphasia.    Patient's neurological examination did show some clumsiness over the left hand and subtle left facial droop.  It is unclear whether the current deficits are from her previous stroke and it would be prudent to do a TIA workup.    Workup completed:  -CT head no acute intracranial process  - LDL 53    Workup pending:  - HbA1c  - MRI brain  - Echo with bubble    - Continue DAPT ASA 81 and Plavix 75 mg once daily  - Atorvastatin 80 mg qd  - A1c,   - Q4h Neurochecks  - Cardiac Telemetry  - PT/OT/SLP recommended  - DVT Prophylaxis               Dylan Morejon MD, MPH  Teleneurologist    HPI:   History was obtained from a review of the electronic record and from the patient and family.??     59-year-old right-handed woman with past medical history of hyperlipidemia, hypertension, multiple strokes in the past, diabetes, coronary artery disease, peripheral vascular disease with multiple stents on dual antiplatelet therapy with aspirin and Plavix presented to the hospital with numbness and expressive aphasia.    Last known well was on 7/24/2025, initially presented with left upper extremity numbness followed by hoarseness of voice later on causing word finding difficulty.  Patient previously had similar

## 2025-07-26 NOTE — ED PROVIDER NOTES
[facility] EMERGENCY DEPT  EMERGENCY DEPARTMENT HISTORY AND PHYSICAL EXAM      Date of evaluation: 2025  Patient Name: Eliz Villalba  Birthdate 1965  MRN: [id number]  ED Provider: Zulma Prather MD   Note Started: 10:11 PM EDT 25    HISTORY OF PRESENT ILLNESS     Chief Complaint   Patient presents with    Aphasia       History Provided By: Patient, only     HPI: Eliz Villalba is a 59 y.o. female who comes to the ED for evaluation of aphasia.  Patient has been having difficulty speaking the last known normal notes yesterday at 11 AM.  Previously she had cervical left-sided residual deficits and currently on Plavix.  There is no current visual changes or hearing changes.  Denies any new focal weakness.  Patient reports history of frequent falls.    PAST MEDICAL HISTORY   Past Medical History:  Past Medical History:   Diagnosis Date    CAD (coronary artery disease)     Diabetes (HCC)     Fibromyalgia     Hyperlipidemia     Hypertension     Lupus     Neuropathy     Stroke (HCC) 2015       Past Surgical History:  Past Surgical History:   Procedure Laterality Date    CHOLECYSTECTOMY      PARTIAL HYSTERECTOMY (CERVIX NOT REMOVED)      TONSILLECTOMY      TUBAL LIGATION      VASCULAR SURGERY Bilateral             Family History:  Family History   Problem Relation Age of Onset    Cancer Mother     Cancer Father        Social History:  Social History     Tobacco Use    Smoking status: Former     Current packs/day: 0.00     Types: Cigarettes     Quit date: 2017     Years since quittin.1    Smokeless tobacco: Never   Substance Use Topics    Alcohol use: Not Currently    Drug use: Never       Allergies:  Allergies   Allergen Reactions    Codeine Hives    Percocet [Oxycodone-Acetaminophen] Hives    Sulfa Antibiotics Other (See Comments)     Blisters      Trazodone Other (See Comments)     Makes pt angry    Lyrica [Pregabalin] Palpitations    Metformin Nausea And Vomiting       PCP: Val Hdz,

## 2025-07-26 NOTE — CARE COORDINATION
07/26/25 1446   Service Assessment   Patient Orientation Alert and Oriented   Cognition Alert   History Provided By Patient   Primary Caregiver Self   Accompanied By/Relationship Spouse   Support Systems Spouse/Significant Other   Patient's Healthcare Decision Maker is: Legal Next of Kin   PCP Verified by CM Yes   Prior Functional Level Independent in ADLs/IADLs   Current Functional Level Independent in ADLs/IADLs   Can patient return to prior living arrangement Yes   Ability to make needs known: Good   Family able to assist with home care needs: Yes   Would you like for me to discuss the discharge plan with any other family members/significant others, and if so, who? Yes   Financial Resources None   Community Resources None   CM/SW Referral Other (see comment)  (None)   Social/Functional History   Lives With Spouse     Cm met with pt and spouse at the bedside. Cm verified home address and spouse contact#.     No DME, however pt indicated  she worked with PT and they recommended a walker. Choice letter signed for DME. Cm asked pt if she was interested in home health and she declined.     Cm received RW order from attending. Referral sent to [facility].     [facility]     Advance Care Planning   Healthcare Decision Maker:    Primary Decision Maker: Jose Villalba - Spouse - [phone]

## 2025-07-27 VITALS
WEIGHT: 129 LBS | OXYGEN SATURATION: 100 % | BODY MASS INDEX: 23.74 KG/M2 | SYSTOLIC BLOOD PRESSURE: 136 MMHG | DIASTOLIC BLOOD PRESSURE: 64 MMHG | HEART RATE: 75 BPM | TEMPERATURE: 97.7 F | HEIGHT: 62 IN | RESPIRATION RATE: 18 BRPM

## 2025-07-27 PROBLEM — R47.01 EXPRESSIVE APHASIA: Status: RESOLVED | Noted: 2025-07-27 | Resolved: 2025-07-27

## 2025-07-27 PROBLEM — N18.9 HISTORY OF ANEMIA DUE TO CKD: Status: ACTIVE | Noted: 2025-07-27

## 2025-07-27 PROBLEM — F32.9 MAJOR DEPRESSIVE DISORDER: Status: ACTIVE | Noted: 2025-07-27

## 2025-07-27 PROBLEM — N17.9 ACUTE KIDNEY INJURY SUPERIMPOSED ON CKD: Status: ACTIVE | Noted: 2025-07-27

## 2025-07-27 PROBLEM — I50.20 HFREF (HEART FAILURE WITH REDUCED EJECTION FRACTION) (HCC): Status: ACTIVE | Noted: 2025-07-27

## 2025-07-27 PROBLEM — N18.9 ACUTE KIDNEY INJURY SUPERIMPOSED ON CKD: Status: ACTIVE | Noted: 2025-07-27

## 2025-07-27 PROBLEM — R47.01 EXPRESSIVE APHASIA: Status: ACTIVE | Noted: 2025-07-27

## 2025-07-27 PROBLEM — Z86.2 HISTORY OF ANEMIA DUE TO CKD: Status: ACTIVE | Noted: 2025-07-27

## 2025-07-27 PROBLEM — G62.9 PERIPHERAL NEUROPATHY: Status: ACTIVE | Noted: 2025-07-27

## 2025-07-27 PROBLEM — N18.9 ACUTE KIDNEY INJURY SUPERIMPOSED ON CKD: Status: RESOLVED | Noted: 2025-07-27 | Resolved: 2025-07-27

## 2025-07-27 PROBLEM — F41.9 ANXIETY: Status: ACTIVE | Noted: 2025-07-27

## 2025-07-27 PROBLEM — N17.9 ACUTE KIDNEY INJURY SUPERIMPOSED ON CKD: Status: RESOLVED | Noted: 2025-07-27 | Resolved: 2025-07-27

## 2025-07-27 LAB
ANION GAP SERPL CALC-SCNC: 5 MMOL/L (ref 2–12)
BASOPHILS # BLD: 0.03 K/UL (ref 0–0.1)
BASOPHILS NFR BLD: 0.4 % (ref 0–1)
BUN SERPL-MCNC: 33 MG/DL (ref 6–20)
BUN/CREAT SERPL: 27 (ref 12–20)
CA-I BLD-MCNC: 9.1 MG/DL (ref 8.5–10.1)
CHLORIDE SERPL-SCNC: 110 MMOL/L (ref 97–108)
CO2 SERPL-SCNC: 26 MMOL/L (ref 21–32)
CREAT SERPL-MCNC: 1.22 MG/DL (ref 0.55–1.02)
DIFFERENTIAL METHOD BLD: ABNORMAL
ECHO AO ASC DIAM: 2.9 CM
ECHO AO ASCENDING AORTA INDEX: 1.82 CM/M2
ECHO AO ROOT DIAM: 2.5 CM
ECHO AO ROOT INDEX: 1.57 CM/M2
ECHO BSA: 1.6 M2
ECHO LA AREA 4C: 18.1 CM2
ECHO LA DIAMETER INDEX: 2.2 CM/M2
ECHO LA DIAMETER: 3.5 CM
ECHO LA MAJOR AXIS: 5.7 CM
ECHO LA TO AORTIC ROOT RATIO: 1.4
ECHO LA VOL MOD A4C: 50 ML (ref 22–52)
ECHO LA VOLUME INDEX MOD A4C: 31 ML/M2 (ref 16–34)
ECHO LV EDV A2C: 74 ML
ECHO LV EDV A4C: 124 ML
ECHO LV EDV INDEX A4C: 78 ML/M2
ECHO LV EDV NDEX A2C: 47 ML/M2
ECHO LV EF PHYSICIAN: 45 %
ECHO LV EJECTION FRACTION A2C: 46 %
ECHO LV EJECTION FRACTION A4C: 42 %
ECHO LV EJECTION FRACTION BIPLANE: 45 % (ref 55–100)
ECHO LV ESV A2C: 40 ML
ECHO LV ESV A4C: 72 ML
ECHO LV ESV INDEX A2C: 25 ML/M2
ECHO LV ESV INDEX A4C: 45 ML/M2
ECHO LV FRACTIONAL SHORTENING: 19 % (ref 28–44)
ECHO LV INTERNAL DIMENSION DIASTOLE INDEX: 3.02 CM/M2
ECHO LV INTERNAL DIMENSION DIASTOLIC: 4.8 CM (ref 3.9–5.3)
ECHO LV INTERNAL DIMENSION SYSTOLIC INDEX: 2.45 CM/M2
ECHO LV INTERNAL DIMENSION SYSTOLIC: 3.9 CM
ECHO LV IVSD: 1 CM (ref 0.6–0.9)
ECHO LV MASS 2D: 181.9 G (ref 67–162)
ECHO LV MASS INDEX 2D: 114.4 G/M2 (ref 43–95)
ECHO LV POSTERIOR WALL DIASTOLIC: 1.1 CM (ref 0.6–0.9)
ECHO LV RELATIVE WALL THICKNESS RATIO: 0.46
ECHO LVOT AREA: 2.8 CM2
ECHO LVOT DIAM: 1.9 CM
ECHO MV MAX VELOCITY: 1.6 M/S
ECHO MV MEAN GRADIENT: 4 MMHG
ECHO MV MEAN VELOCITY: 1 M/S
ECHO MV PEAK GRADIENT: 10 MMHG
ECHO MV REGURGITANT PEAK GRADIENT: 46 MMHG
ECHO MV REGURGITANT PEAK VELOCITY: 3.4 M/S
ECHO MV VTI: 33.1 CM
ECHO TV REGURGITANT MAX VELOCITY: 2.17 M/S
ECHO TV REGURGITANT PEAK GRADIENT: 19 MMHG
ECHO TV REGURGITANT VTI: 67.3 CM
EKG ATRIAL RATE: 77 BPM
EKG DIAGNOSIS: NORMAL
EKG P AXIS: 51 DEGREES
EKG P-R INTERVAL: 162 MS
EKG Q-T INTERVAL: 372 MS
EKG QRS DURATION: 78 MS
EKG QTC CALCULATION (BAZETT): 420 MS
EKG R AXIS: 14 DEGREES
EKG T AXIS: 120 DEGREES
EKG VENTRICULAR RATE: 77 BPM
EOSINOPHIL # BLD: 0.23 K/UL (ref 0–0.4)
EOSINOPHIL NFR BLD: 3.2 % (ref 0–7)
ERYTHROCYTE [DISTWIDTH] IN BLOOD BY AUTOMATED COUNT: 14.3 % (ref 11.5–14.5)
GLUCOSE BLD STRIP.AUTO-MCNC: 186 MG/DL (ref 65–100)
GLUCOSE SERPL-MCNC: 184 MG/DL (ref 65–100)
HCT VFR BLD AUTO: 29.9 % (ref 35–47)
HGB BLD-MCNC: 9.4 G/DL (ref 11.5–16)
IMM GRANULOCYTES # BLD AUTO: 0.03 K/UL (ref 0–0.04)
IMM GRANULOCYTES NFR BLD AUTO: 0.4 % (ref 0–0.5)
LYMPHOCYTES # BLD: 1.66 K/UL (ref 0.8–3.5)
LYMPHOCYTES NFR BLD: 23.2 % (ref 12–49)
MAGNESIUM SERPL-MCNC: 2.2 MG/DL (ref 1.6–2.4)
MCH RBC QN AUTO: 26.4 PG (ref 26–34)
MCHC RBC AUTO-ENTMCNC: 31.4 G/DL (ref 30–36.5)
MCV RBC AUTO: 84 FL (ref 80–99)
MONOCYTES # BLD: 0.5 K/UL (ref 0–1)
MONOCYTES NFR BLD: 7 % (ref 5–13)
NEUTS SEG # BLD: 4.72 K/UL (ref 1.8–8)
NEUTS SEG NFR BLD: 65.8 % (ref 32–75)
NRBC # BLD: 0 K/UL (ref 0–0.01)
NRBC BLD-RTO: 0 PER 100 WBC
PERFORMED BY:: ABNORMAL
PHOSPHATE SERPL-MCNC: 3.5 MG/DL (ref 2.6–4.7)
PLATELET # BLD AUTO: 193 K/UL (ref 150–400)
PMV BLD AUTO: 11.2 FL (ref 8.9–12.9)
POTASSIUM SERPL-SCNC: 4.3 MMOL/L (ref 3.5–5.1)
RBC # BLD AUTO: 3.56 M/UL (ref 3.8–5.2)
SODIUM SERPL-SCNC: 141 MMOL/L (ref 136–145)
WBC # BLD AUTO: 7.2 K/UL (ref 3.6–11)

## 2025-07-27 PROCEDURE — 6370000000 HC RX 637 (ALT 250 FOR IP)

## 2025-07-27 PROCEDURE — 6360000002 HC RX W HCPCS

## 2025-07-27 PROCEDURE — 84100 ASSAY OF PHOSPHORUS: CPT

## 2025-07-27 PROCEDURE — 80048 BASIC METABOLIC PNL TOTAL CA: CPT

## 2025-07-27 PROCEDURE — 36415 COLL VENOUS BLD VENIPUNCTURE: CPT

## 2025-07-27 PROCEDURE — 2500000003 HC RX 250 WO HCPCS

## 2025-07-27 PROCEDURE — 82962 GLUCOSE BLOOD TEST: CPT

## 2025-07-27 PROCEDURE — 83735 ASSAY OF MAGNESIUM: CPT

## 2025-07-27 PROCEDURE — 99221 1ST HOSP IP/OBS SF/LOW 40: CPT | Performed by: STUDENT IN AN ORGANIZED HEALTH CARE EDUCATION/TRAINING PROGRAM

## 2025-07-27 PROCEDURE — 85025 COMPLETE CBC W/AUTO DIFF WBC: CPT

## 2025-07-27 RX ADMIN — DULOXETINE 60 MG: 30 CAPSULE, DELAYED RELEASE ORAL at 09:17

## 2025-07-27 RX ADMIN — ENOXAPARIN SODIUM 40 MG: 100 INJECTION SUBCUTANEOUS at 09:17

## 2025-07-27 RX ADMIN — SODIUM CHLORIDE, PRESERVATIVE FREE 10 ML: 5 INJECTION INTRAVENOUS at 09:17

## 2025-07-27 RX ADMIN — ASPIRIN 81 MG: 81 TABLET, CHEWABLE ORAL at 09:17

## 2025-07-27 RX ADMIN — CLOPIDOGREL BISULFATE 75 MG: 75 TABLET, FILM COATED ORAL at 09:17

## 2025-07-27 RX ADMIN — CARVEDILOL 6.25 MG: 3.12 TABLET, FILM COATED ORAL at 09:17

## 2025-07-27 RX ADMIN — TIZANIDINE 2 MG: 2 TABLET ORAL at 04:47

## 2025-07-27 RX ADMIN — INSULIN LISPRO 1 UNITS: 100 INJECTION, SOLUTION INTRAVENOUS; SUBCUTANEOUS at 09:17

## 2025-07-27 RX ADMIN — SERTRALINE HYDROCHLORIDE 100 MG: 50 TABLET ORAL at 09:17

## 2025-07-27 RX ADMIN — BUSPIRONE HYDROCHLORIDE 10 MG: 5 TABLET ORAL at 09:17

## 2025-07-27 ASSESSMENT — PAIN SCALES - GENERAL
PAINLEVEL_OUTOF10: 0
PAINLEVEL_OUTOF10: 0

## 2025-07-27 NOTE — CONSULTS
Progress Note:      [facility] NEUROLOGY PROGRESS NOTE          Impression/Recommendations:      59-year-old right-handed woman with past medical history of hyperlipidemia, hypertension, multiple strokes in the past, diabetes, coronary artery disease, peripheral vascular disease with multiple stents on dual antiplatelet therapy with aspirin and Plavix presented to the hospital with numbness and expressive aphasia.     Patient's neurological examination did show some clumsiness over the left hand and subtle left facial droop.  It is unclear whether the current deficits are from her previous stroke and it would be prudent to do a TIA workup.     Workup completed:  -CT head no acute intracranial process  - LDL 53  - MRI brain - Acute stroke in the right basal ganglia  - ECHO With Bubble - PENDING     Workup pending:  - None    Plan:    - Continue DAPT ASA 81 and Plavix 75 mg once daily  - Atorvastatin 80 mg qd  - A1c,   - Q4h Neurochecks  - Cardiac Telemetry  - PT/OT/SLP recommended  - DVT Prophylaxis    - Outpatient Neurology Follow up. No further inpatient neurological work up at this point. Neurology will sign off, please call if any questions or any new symptoms arises.     Dylan Morejon MD, MPH  Teleneurologist    SUBJECTIVE   Eliz Villalba is a 59 y.o. female being evaluated for CVA.    ?     OBJECTIVE   ROS, PMH, FH, SH were all reviewed and are unchanged.   Neurological Examination:   BP (!) 117/52   Pulse 79   Temp 98.2 °F (36.8 °C) (Oral)   Resp 16   Ht 1.575 m (5' 2\")   Wt 58.5 kg (129 lb)   SpO2 97%   BMI 23.59 kg/m²    Assisted by FABIAN Dimas   Mental status:  Patient was Alert.  Speech was fluent and articulate. Mental status exam was grossly within normal limits.   Cranial nerves:   Pupils were equally reactive. EOMI.  Subtle left facial upper motor neuron  There was a good shrug.   Tongue protruded on the midline.   Motor:   Consistent with subtle left upper extremity weakness and

## 2025-07-27 NOTE — PLAN OF CARE
Problem: Chronic Conditions and Co-morbidities  Goal: Patient's chronic conditions and co-morbidity symptoms are monitored and maintained or improved  7/25/2025 2229 by Prasanna Stanton RN  Outcome: Progressing  Flowsheets (Taken 7/25/2025 1850 by Masha Hoffmann, RN)  Care Plan - Patient's Chronic Conditions and Co-Morbidity Symptoms are Monitored and Maintained or Improved: Monitor and assess patient's chronic conditions and comorbid symptoms for stability, deterioration, or improvement  7/25/2025 1842 by Masha Hoffmann RN  Outcome: Progressing     Problem: Discharge Planning  Goal: Discharge to home or other facility with appropriate resources  7/25/2025 2229 by Prasanna Stanton RN  Outcome: Progressing  Flowsheets (Taken 7/25/2025 1850 by Masha Hoffmann RN)  Discharge to home or other facility with appropriate resources: Identify barriers to discharge with patient and caregiver  7/25/2025 1842 by Masha Hoffmann RN  Outcome: Progressing     Problem: Pain  Goal: Verbalizes/displays adequate comfort level or baseline comfort level  7/25/2025 2229 by Prasanna Stanton RN  Outcome: Progressing  Flowsheets (Taken 7/25/2025 1849 by Masha Hoffmann, RN)  Verbalizes/displays adequate comfort level or baseline comfort level: Encourage patient to monitor pain and request assistance  7/25/2025 1842 by Masha Hoffmann RN  Outcome: Progressing     Problem: Safety - Adult  Goal: Free from fall injury  7/25/2025 2229 by Prasanna Stanton RN  Outcome: Progressing  7/25/2025 1842 by Masha Hoffmann RN  Outcome: Progressing     
  Problem: Chronic Conditions and Co-morbidities  Goal: Patient's chronic conditions and co-morbidity symptoms are monitored and maintained or improved  Outcome: Progressing     Problem: Discharge Planning  Goal: Discharge to home or other facility with appropriate resources  Outcome: Progressing     Problem: Pain  Goal: Verbalizes/displays adequate comfort level or baseline comfort level  Outcome: Progressing     Problem: Safety - Adult  Goal: Free from fall injury  Outcome: Progressing     
  Problem: Discharge Planning  Goal: Discharge to home or other facility with appropriate resources  7/27/2025 0039 by Rizwana Lopez RN  Outcome: Progressing  7/26/2025 1441 by Leah French RN  Outcome: Progressing  Flowsheets (Taken 7/26/2025 0757)  Discharge to home or other facility with appropriate resources:   Identify barriers to discharge with patient and caregiver   Arrange for needed discharge resources and transportation as appropriate   Identify discharge learning needs (meds, wound care, etc)     Problem: Pain  Goal: Verbalizes/displays adequate comfort level or baseline comfort level  7/27/2025 0039 by Rizwana Lopez RN  Outcome: Progressing  7/26/2025 1441 by Leah French RN  Outcome: Progressing     Problem: Safety - Adult  Goal: Free from fall injury  7/27/2025 0039 by Rizwana Lopez RN  Outcome: Progressing  7/26/2025 1441 by Leah French RN  Outcome: Progressing     
therapeutic activities    Recommend next PT session: EOB transfers, seated dynamic balance, standing static balance, ambulation with AD , and ambulation without AD    Frequency/Duration: Patient will be followed by physical therapy:  3-5x/week to address goals.    Recommendation for discharge: (in order for the patient to meet his/her long term goals)  Outpatient physical therapy for balance training     Potential barriers for safe discharge: pt is a high fall risk.    IF patient discharges home will need the following DME: rolling walker       SUBJECTIVE:   Patient stated “I was just here in June for congestive heart failure.”    OBJECTIVE DATA SUMMARY:   HISTORY:    Past Medical History:   Diagnosis Date    CAD (coronary artery disease)     Diabetes (HCC)     Fibromyalgia     Hyperlipidemia     Hypertension     Lupus     Neuropathy     Stroke (HCC) 12/2015     Past Surgical History:   Procedure Laterality Date    CHOLECYSTECTOMY      PARTIAL HYSTERECTOMY (CERVIX NOT REMOVED)      TONSILLECTOMY      TUBAL LIGATION      VASCULAR SURGERY Bilateral     2020        Home Situation:  Social/Functional History  Lives With: Spouse  Type of Home: Mobile home  Home Layout: One level  Home Access: Stairs to enter with rails  Entrance Stairs - Number of Steps: 5  Entrance Stairs - Rails: Both  Home Equipment: Cane  Has the patient had two or more falls in the past year or any fall with injury in the past year?: Yes  Receives Help From: Family  Prior Level of Assist for ADLs: Independent  Prior Level of Assist for Homemaking: Independent  Homemaking Responsibilities: Yes  Prior Level of Assist for Ambulation: Independent household ambulator, with or without device, Independent community ambulator, with or without device  Prior Level of Assist for Transfers: Independent  Active : Yes    Cognitive/Behavioral Status:  Orientation  Overall Orientation Status: Within Normal Limits  Orientation Level: Oriented

## 2025-07-27 NOTE — DISCHARGE SUMMARY
normal.         Discharge/Recent Laboratory Results:  Recent Labs     07/27/25  0740      K 4.3   *   CO2 26   BUN 33*   CREATININE 1.22*   GLUCOSE 184*   CALCIUM 9.1   PHOS 3.5   MG 2.2     Recent Labs     07/27/25  0740   HGB 9.4*   HCT 29.9*   WBC 7.2          Discharge Medications:     Medication List        START taking these medications      insulin glargine 100 UNIT/ML injection vial  Commonly known as: LANTUS            CHANGE how you take these medications      DULoxetine 60 MG extended release capsule  Commonly known as: CYMBALTA  What changed: Another medication with the same name was removed. Continue taking this medication, and follow the directions you see here.            CONTINUE taking these medications      aspirin 81 MG EC tablet     atorvastatin 80 MG tablet  Commonly known as: LIPITOR     busPIRone 10 MG tablet  Commonly known as: BUSPAR     carvedilol 6.25 MG tablet  Commonly known as: COREG     clopidogrel 75 MG tablet  Commonly known as: PLAVIX     furosemide 40 MG tablet  Commonly known as: Lasix  Take 1 tablet by mouth daily START AFTER FOLLOWING UP WITH NEPHROLOGY AND CARDIOLOGY     glipiZIDE 10 MG tablet  Commonly known as: GLUCOTROL     Mounjaro 2.5 MG/0.5ML Soaj pen  Generic drug: Tirzepatide     ondansetron 4 MG disintegrating tablet  Commonly known as: ZOFRAN-ODT  Take 1 tablet by mouth every 8 hours as needed for Nausea or Vomiting     pantoprazole 20 MG tablet  Commonly known as: PROTONIX     polyethylene glycol 17 g packet  Commonly known as: GLYCOLAX  Take 1 packet by mouth daily as needed for Constipation     sacubitril-valsartan 24-26 MG per tablet  Commonly known as: ENTRESTO  Take 1 tablet by mouth 2 times daily     sertraline 100 MG tablet  Commonly known as: ZOLOFT     tiZANidine 2 MG tablet  Commonly known as: ZANAFLEX     zolpidem 10 MG tablet  Commonly known as: AMBIEN            STOP taking these medications      amLODIPine 10 MG tablet  Commonly known